# Patient Record
Sex: FEMALE | Race: WHITE | Employment: OTHER | URBAN - METROPOLITAN AREA
[De-identification: names, ages, dates, MRNs, and addresses within clinical notes are randomized per-mention and may not be internally consistent; named-entity substitution may affect disease eponyms.]

---

## 2017-04-13 ENCOUNTER — HOSPITAL ENCOUNTER (OUTPATIENT)
Dept: INFUSION THERAPY | Age: 82
Discharge: HOME | End: 2017-04-13
Payer: MEDICARE

## 2017-04-13 VITALS
RESPIRATION RATE: 18 BRPM | HEART RATE: 60 BPM | DIASTOLIC BLOOD PRESSURE: 57 MMHG | OXYGEN SATURATION: 98 % | SYSTOLIC BLOOD PRESSURE: 126 MMHG | TEMPERATURE: 97.5 F

## 2017-04-13 PROCEDURE — 74011250636 HC RX REV CODE- 250/636

## 2017-04-13 PROCEDURE — 96372 THER/PROPH/DIAG INJ SC/IM: CPT

## 2017-04-13 RX ADMIN — DENOSUMAB 60 MG: 60 INJECTION SUBCUTANEOUS at 09:59

## 2017-10-04 ENCOUNTER — HOSPITAL ENCOUNTER (OUTPATIENT)
Dept: VASCULAR SURGERY | Age: 82
Discharge: HOME | End: 2017-10-04
Attending: INTERNAL MEDICINE
Payer: MEDICARE

## 2017-10-04 DIAGNOSIS — I77.9 DISEASE OF ARTERY (HCC): ICD-10-CM

## 2017-10-04 PROCEDURE — 93880 EXTRACRANIAL BILAT STUDY: CPT

## 2017-10-04 NOTE — PROCEDURES
[facility]  *** FINAL REPORT ***    Name: Silvestre Cevallos  MRN: [id number]    Outpatient  : 1935  HIS Order #: [id number]  [id number] [facility] Visit #: [id number]  Date: 04 Oct 2017    TYPE OF TEST: Cerebrovascular Duplex    REASON FOR TEST  Transient ischemic attacks, General health evaluation    Right Carotid:-             Proximal               Mid                 Distal  cm/s  Systolic  Diastolic  Systolic  Diastolic  Systolic  Diastolic  CCA:     69.0       9.0       60.0      11.0       70.0      11.0  Bulb:  ECA:    135.0  ICA:     50.0      10.0       70.0      19.0       79.0      25.0  ICA/CCA:  1.0       2.8    ICA Stenosis: <50%    Right Vertebral:-  Finding: Antegrade  Sys:       36.0  Bridgett:        9.0    Right Subclavian: Normal    Left Carotid:-            Proximal                Mid                 Distal  cm/s  Systolic  Diastolic  Systolic  Diastolic  Systolic  Diastolic  CCA:     87.3      13.0      105.0      16.0       76.0      14.0  Bulb:  ECA:    104.0  ICA:     84.0      24.0       77.0      21.0       69.0      21.0  ICA/CCA:  0.8       1.5    ICA Stenosis: <50%    Left Vertebral:-  Finding: Antegrade  Sys:       60.0  Bridgett:       16.0    Left Subclavian: Normal    INTERPRETATION/FINDINGS  Duplex images were obtained using 2-D gray scale, color flow, and  spectral Doppler analysis. 1. Bilateral <50% stenosis of the internal carotid arteries. 2. No significant stenosis in the external carotid arteries  bilaterally. 3. Antegrade flow in both vertebral arteries. 4. Normal flow in both subclavian arteries. Plaque Morphology:  1. Homogeneous plaque in the bulb and right ICA. 2. Hyperechoic plaque in the bulb and left ICA. ADDITIONAL COMMENTS    I have personally reviewed the data relevant to the interpretation of  this  study. TECHNOLOGIST: Devika Martinez RDCS, RVT  Signed: 10/04/2017 09:59 AM    PHYSICIAN: Aramis Juarez.  Kristi Saleem MD  Signed: 10/05/2017 10:56 AM

## 2017-10-12 ENCOUNTER — HOSPITAL ENCOUNTER (OUTPATIENT)
Dept: INFUSION THERAPY | Age: 82
End: 2017-10-12

## 2017-10-26 ENCOUNTER — HOSPITAL ENCOUNTER (OUTPATIENT)
Dept: INFUSION THERAPY | Age: 82
Discharge: HOME | End: 2017-10-26
Payer: MEDICARE

## 2017-10-26 VITALS
TEMPERATURE: 97.5 F | RESPIRATION RATE: 18 BRPM | SYSTOLIC BLOOD PRESSURE: 131 MMHG | OXYGEN SATURATION: 93 % | HEART RATE: 59 BPM | DIASTOLIC BLOOD PRESSURE: 54 MMHG

## 2017-10-26 PROCEDURE — 74011250636 HC RX REV CODE- 250/636

## 2017-10-26 PROCEDURE — 96372 THER/PROPH/DIAG INJ SC/IM: CPT

## 2017-10-26 RX ADMIN — DENOSUMAB 60 MG: 60 INJECTION SUBCUTANEOUS at 10:29

## 2017-10-26 NOTE — PROGRESS NOTES
[facility] Progress Note    Date: 2017    Name: Mckenzie Mccallum    MRN: [id number]         : 1935      Ms. Truong arrived to [facility] at 1010. Ms. Sophia Lopes was assessed and education was provided. Ms. Brittany Traylor vitals were reviewed. Visit Vitals    /54 (BP 1 Location: Left arm, BP Patient Position: Sitting)    Pulse (!) 59    Temp 97.5 °F (36.4 °C)    Resp 18    SpO2 93%           Lab results were obtained and reviewed. Prolia   60 mg was administered as ordered SQ in patient's Right upper arm . Ms. Truong tolerated well without complaints. Ms. Sophia Lopes was discharged from [place] [address] in stable condition at 1030.     Ansley Connell RN  2017

## 2017-12-14 ENCOUNTER — HOSPITAL ENCOUNTER (OUTPATIENT)
Dept: PHYSICAL THERAPY | Age: 82
Discharge: HOME | End: 2017-12-14
Payer: MEDICARE

## 2017-12-14 PROCEDURE — 97112 NEUROMUSCULAR REEDUCATION: CPT | Performed by: PHYSICAL THERAPIST

## 2017-12-14 PROCEDURE — G8978 MOBILITY CURRENT STATUS: HCPCS | Performed by: PHYSICAL THERAPIST

## 2017-12-14 PROCEDURE — G8979 MOBILITY GOAL STATUS: HCPCS | Performed by: PHYSICAL THERAPIST

## 2017-12-14 PROCEDURE — 97162 PT EVAL MOD COMPLEX 30 MIN: CPT | Performed by: PHYSICAL THERAPIST

## 2017-12-14 PROCEDURE — 97161 PT EVAL LOW COMPLEX 20 MIN: CPT | Performed by: PHYSICAL THERAPIST

## 2017-12-14 NOTE — PROGRESS NOTES
PT DAILY TREATMENT NOTE/NEURO EVAL 3-16    Patient Name: Eulogio Cortez  Date:2017  : 1935  [x]  Patient  Verified  [place]: [place] MED[place] / Plan: [place] MEDICARE PART A & B / Product Type: Medicare /    In time:11:00  Out time:11:50  Total Treatment Time (min): 50  Total Timed Codes (min): 25  1:1 Treatment Time ([facility] only): 50   Visit #: 1 of 12    Treatment Area: Left leg weakness [R29.898]    SUBJECTIVE  Pain Level (0-10 scale): 0  []constant []intermittent []improving []worsening []no change since onset    Any medication changes, allergies to medications, adverse drug reactions, diagnosis change, or new procedure performed?: [x] No    [] Yes (see summary sheet for update)  Subjective functional status/changes:     Patient has CC of difficulty with walking and weakness, and decreased balance for 6-8months. SONI: prior fall and pelvic fracture, onset of weakness over the past few months. Denies pain. Denies numbness/tingling. Denies popping/clicking. Denies red flags: SOB, chest pain, dizziness/lightheadedness, blurred/double vision, HA, chills/fevers, night sweats, change in bowel/bladder control, abdominal pain, difficulty swallowing, slurred speech, unexplained weight gain/loss. PMHx: COPD, high cholesterol, stage 3 kd, DMII, HTN, PVD, CAD. Surgical Hx: CABGx2. Social Hx: 2 level home (no need for access to 2nd floor), alcohol, tobacco, work status. PLOF: regular walking, no stairs, independent ADLs. CLOF: decreased exercise level, does not use stairs.      OBJECTIVE/EXAMINATION    25 min [x]Eval                  []Re-Eval     25 min Neuromuscular Re-education:  [x]  See flow sheet :   Rationale: improve coordination, improve balance and increase proprioception  to improve the patients ability to complete ADLs            With   [] TE   [] TA   [] neuro   [] other: Patient Education: [x] Review HEP    [] Progressed/Changed HEP based on:   [] positioning   [] body mechanics   [] transfers   [] heat/ice application    [] other:        Physical Therapy Evaluation - Neurologic    Posture: [] Poor    [x] Fair    [] Good    Describe:       Gait: [] Normal    [x] Abnormal    Device:    SPC  Describe:    ROM:             WFL  Strength (MMT):  Hip L (1-5) R (1-5)   Hip Flexion 3 3   Hip Ext 2+ 2+   Hip ABD 3 3   Hip ADD     Hip ER     Hip IR       Knee L (1-5) R (1-5)   Knee Flexion 4 4   Knee Extension 4 4   Ankle PF 4 4   Ankle DF 4 4   Other         Reflexes: [x] Not Tested   Left Right   Biceps (C5)     Brachioradiais (C6)     Triceps (C7)     Knee Jerk (L4)     Ankle Jerk (SI)         Optional Tests:  30 second Sit<>stand: 8x  TU.88 secs  Tinetti Balance Assessment: 8 on Balance and Gait sections; total of 16/ _ indicates HIGH falls risk       Pain Level (0-10 scale) post treatment: 0    ASSESSMENT/Changes in Function: Patient presents with weakness and difficulty with ambulation. She is a falls risk per objective measures. Patient will continue to benefit from skilled PT services to modify and progress therapeutic interventions, address functional mobility deficits, address ROM deficits, address strength deficits, analyze and address soft tissue restrictions, analyze and cue movement patterns, analyze and modify body mechanics/ergonomics and assess and modify postural abnormalities to attain remaining goals.      [x]  See Plan of Care  []  See progress note/recertification  []  See Discharge Summary         Progress towards goals / Updated goals:  See POC    PLAN  []  Upgrade activities as tolerated     [x]  Continue plan of care  []  Update interventions per flow sheet       []  Discharge due to:_  []  Other:_      Stevenson Lam PT, DPT 2017  11:49 AM

## 2017-12-14 NOTE — PROGRESS NOTES
[facility]  [address] [place], [address]  Ph [phone]  Fx [phone]    Plan of Care/ Statement of Necessity for Physical Therapy Services    Patient name: Vibha Bergeron Start of Care: 2017   Referral source: Nba Velazquez, * : 1935    Medical Diagnosis: Left leg weakness [R29.898]   Onset Date:2017    Treatment Diagnosis: Weakness, difficulty with ambulation   Prior Hospitalization: see medical history Provider#: [id number]   Medications: Verified on Patient summary List    Comorbidities: COPD, high cholesterol, stage 3 CKD DMII, HTN, PVD, CAD   Prior Level of Function: regular walking, no stairs, independent ADLs      The Plan of Care and following information is based on the information from the initial evaluation. Assessment/ key information: Patient presents with weakness and difficulty with ambulation. She is a falls risk per objective measures. Patient will continue to benefit from skilled PT services to modify and progress therapeutic interventions, address functional mobility deficits, address ROM deficits, address strength deficits, analyze and address soft tissue restrictions, analyze and cue movement patterns, analyze and modify body mechanics/ergonomics and assess and modify postural abnormalities to attain remaining goals. Evaluation Complexity History HIGH Complexity :3+ comorbidities / personal factors will impact the outcome/ POC ; Examination MEDIUM Complexity : 3 Standardized tests and measures addressing body structure, function, activity limitation and / or participation in recreation  ;Presentation MEDIUM Complexity : Evolving with changing characteristics  ; Clinical Decision Making MEDIUM Complexity : FOTO score of 26-74  Overall Complexity Rating: MEDIUM  Problem List: pain affecting function, decrease strength, impaired gait/ balance, decrease ADL/ functional abilitiies, decrease activity tolerance, decrease flexibility/ joint mobility and decrease transfer abilities   Treatment Plan may include any combination of the following: Therapeutic exercise, Therapeutic activities, Neuromuscular re-education, Physical agent/modality, Gait/balance training, Manual therapy, Aquatic therapy, Patient education, Self Care training, Functional mobility training, Home safety training and Stair training  Patient / Family readiness to learn indicated by: asking questions, trying to perform skills and interest  Persons(s) to be included in education: patient (P)  Barriers to Learning/Limitations: None  Patient Goal (s): walk properly without cane  Patient Self Reported Health Status: fair  Rehabilitation Potential: fair    Short Term Goals: To be accomplished in 2 weeks:   Patient will report compliance with HEP at least 1x/day to aid in rehabilitation program.   Status at IE: NA   Current:     Patient will decrease TUG by 5 seconds to display MCID and progression to decreased falls risk. Status at IE: 18.8 seconds   Current:       Long Term Goals: To be accomplished in 6 weeks:   Patient will increase strength to 5/5 throughout B LEs to aid in return recreational activities and ADLs. Status at IE: 4/5 grossly   Current:     Patient will improve Tinetti score to 19 or greater to demonstrate decreased risk for falls. Status at IE: 16   Current:     Patient will ambulate 1000ft on level surface with LRAD and normalized gait. Status at IE:na   Current:     Patient will improve FOTO to TBD points overall to demonstrate improvement in functional ability. Status at IE:TBD   Current:        Frequency / Duration: Patient to be seen 2 times per week for 6 weeks.     Patient/ Caregiver education and instruction: Diagnosis, prognosis, self care, activity modification and exercises   [x]  Plan of care has been reviewed with PTA    G-Codes (GP)  Mobility   Current  CL= 60-79%   Goal  CK= 40-59%    The severity rating is based on clinical judgment and the FOTO score. Certification Period: 12/14/2017 - 1/25/2018  Dianna Anne PT, DPT  12/14/2017 12:01 PM    ________________________________________________________________________    I certify that the above Therapy Services are being furnished while the patient is under my care. I agree with the treatment plan and certify that this therapy is necessary.     Physician's Signature:____________________  Date:____________Time: _________    Please sign and return to [facility]  [address], [address]  Ph [phone]  Fx [phone]

## 2017-12-19 ENCOUNTER — HOSPITAL ENCOUNTER (OUTPATIENT)
Dept: PHYSICAL THERAPY | Age: 82
Discharge: HOME | End: 2017-12-19
Payer: MEDICARE

## 2017-12-19 PROCEDURE — 97110 THERAPEUTIC EXERCISES: CPT | Performed by: PHYSICAL THERAPIST

## 2017-12-19 NOTE — PROGRESS NOTES
PT DAILY TREATMENT NOTE - [facility]     Patient Name: Teresa Rivera  Date:2017  : 1935  [x]  Patient  Verified  Payor: [place] MEDICARE / Plan: [place] MEDICARE PART A & B / Product Type: Medicare /    In time:11:04  Out time:11:45  Total Treatment Time (min): 35  Total Timed Codes (min): 35  1:1 Treatment Time ( only): 15   Visit #: 2 of 12    Treatment Area: Left leg weakness [R29.898]    SUBJECTIVE  Pain Level (0-10 scale): 0  Any medication changes, allergies to medications, adverse drug reactions, diagnosis change, or new procedure performed?: [x] No    [] Yes (see summary sheet for update)  Subjective functional status/changes:   [] No changes reported  I just have weakness.      OBJECTIVE    Modality rationale:     Min Type Additional Details    [] Estim:  []Unatt       []IFC  []Premod                        []Other:  []w/ice   []w/heat  Position:  Location:    [] Estim: []Att    []TENS instruct  []NMES                    []Other:  []w/US   []w/ice   []w/heat  Position:  Location:    []  Traction: [] Cervical       []Lumbar                       [] Prone          []Supine                       []Intermittent   []Continuous Lbs:  [] before manual  [] after manual    []  Ultrasound: []Continuous   [] Pulsed                           []1MHz   []3MHz W/cm2:  Location:    []  Iontophoresis with dexamethasone         Location: [] Take home patch   [] In clinic    []  Ice     []  heat  []  Ice massage  []  Laser   []  Anodyne Position:  Location:    []  Laser with stim  []  Other:  Position:  Location:    []  Vasopneumatic Device Pressure:       [] lo [] med [] hi   Temperature: [] lo [] med [] hi   [] Skin assessment post-treatment:  []intact []redness- no adverse reaction    []redness - adverse reaction:       20 tech, 15 1:1 min Therapeutic Exercise:  [x] See flow sheet :   Rationale: increase ROM, increase strength, improve coordination and improve balance to improve the patients ability to improve mobility           With   [x] TE   [] TA   [] neuro   [] other: Patient Education: [x] Review HEP    [x] Progressed/Changed HEP based on:   [] positioning   [] body mechanics   [] transfers   [] heat/ice application    [] other: discussed and issued HEP. Other Objective/Functional Measures: good balance with tandem in bars, unsteady with side stepping      Pain Level (0-10 scale) post treatment: 0    ASSESSMENT/Changes in Function: pt tolerated session well this date. HEP reviewed and issued to client. Patient will continue to benefit from skilled PT services to modify and progress therapeutic interventions, address functional mobility deficits, address ROM deficits, address strength deficits, analyze and address soft tissue restrictions, analyze and cue movement patterns, analyze and modify body mechanics/ergonomics, assess and modify postural abnormalities and address imbalance/dizziness to attain remaining goals. []  See Plan of Care  []  See progress note/recertification  []  See Discharge Summary         Progress towards goals / Updated goals:  Short Term Goals: To be accomplished in 2 weeks:                        Patient will report compliance with HEP at least 1x/day to aid in rehabilitation program.                        Status at IE: NA                        Current: issued this date w/ cues for posture and safety                            Patient will decrease TUG by 5 seconds to display MCID and progression to decreased falls risk. Status at IE: 18.8 seconds                        Current: n/a         Long Term Goals: To be accomplished in 6 weeks:                        Patient will increase strength to 5/5 throughout B LEs to aid in return recreational activities and ADLs.                         Status at IE: 4/5 grossly                        Current: n/a                           Patient will improve Tinetti score to 19 or greater to demonstrate decreased risk for falls. Status at IE: 16                        Current: n/a                           Patient will ambulate 1000ft on level surface with LRAD and normalized gait. Status at IE:na                        Current: n/a                           Patient will improve FOTO to TBD points overall to demonstrate improvement in functional ability.                          Status at IE:TBD                        Current: n/a     PLAN  [x]  Upgrade activities as tolerated     [x]  Continue plan of care  []  Update interventions per flow sheet       []  Discharge due to:_  []  Other:_      Margarito Kearney PT 12/19/2017  12:10 PM    Future Appointments  Date Time Provider Quintin Costello   1/4/2018 10:30 AM Margarito Kearney [facility]   1/10/2018 11:00 AM Mary Grace Infante [facility]   1/12/2018 9:30 AM Giovanni Brannon [facility]   1/17/2018 9:30 AM Giovanni Brannon [facility]   1/19/2018 9:30 AM Giovanni Brannon [facility]   1/22/2018 9:30 AM Giovanni Brannon [facility]   1/25/2018 9:30 AM Giovanni Brannon [facility]   4/26/2018 10:00 AM [facility] INFUSION NURSE 1 [facility]

## 2018-01-04 ENCOUNTER — APPOINTMENT (OUTPATIENT)
Dept: PHYSICAL THERAPY | Age: 83
End: 2018-01-04
Payer: MEDICARE

## 2018-01-10 ENCOUNTER — APPOINTMENT (OUTPATIENT)
Dept: PHYSICAL THERAPY | Age: 83
End: 2018-01-10
Payer: MEDICARE

## 2018-01-12 ENCOUNTER — APPOINTMENT (OUTPATIENT)
Dept: PHYSICAL THERAPY | Age: 83
End: 2018-01-12
Payer: MEDICARE

## 2018-01-17 ENCOUNTER — APPOINTMENT (OUTPATIENT)
Dept: PHYSICAL THERAPY | Age: 83
End: 2018-01-17
Payer: MEDICARE

## 2018-01-19 ENCOUNTER — HOSPITAL ENCOUNTER (OUTPATIENT)
Dept: PHYSICAL THERAPY | Age: 83
Discharge: HOME | End: 2018-01-19
Payer: MEDICARE

## 2018-01-19 PROCEDURE — 97110 THERAPEUTIC EXERCISES: CPT | Performed by: PHYSICAL THERAPIST

## 2018-01-19 PROCEDURE — G8978 MOBILITY CURRENT STATUS: HCPCS | Performed by: PHYSICAL THERAPIST

## 2018-01-19 PROCEDURE — G8979 MOBILITY GOAL STATUS: HCPCS | Performed by: PHYSICAL THERAPIST

## 2018-01-19 NOTE — PROGRESS NOTES
PT DAILY TREATMENT NOTE - [facility]     Patient Name: Anamika Dumont  Date:2018  : 1935  [x]  Patient  Verified  Payor: Jenn Fonseca / Plan: [place] MEDICARE PART A & B / Product Type: Medicare /    In BSIU:[id number]  Out time:1032  Total Treatment Time (min): 59  Total Timed Codes (min): 59  1:1 Treatment Time ([facility] only): 39  Visit #: 3 of 12    Treatment Area: Left leg weakness [R29.898]    SUBJECTIVE  Pain Level (0-10 scale): 0  Any medication changes, allergies to medications, adverse drug reactions, diagnosis change, or new procedure performed?: [x] No    [] Yes (see summary sheet for update)  Subjective functional status/changes:   [] No changes reported  Pt has had a month break from therapy due to transportation and weather and holidays but pt states attempting ex at home maily SLR in standing and squats     OBJECTIVE        59 min Therapeutic Exercise:  [x] See flow sheet :   Rationale: increase ROM, increase strength, improve coordination and improve balance to improve the patients ability to improve mobility             With   [] TE   [] TA   [] neuro   [] other: Patient Education: [x] Review HEP    [] Progressed/Changed HEP based on:   [] positioning   [] body mechanics   [] transfers   [] heat/ice application    [] other:      Other Objective/Functional Measures: TUG 21 sec without assist device   FOTO:      Pain Level (0-10 scale) post treatment: 0    ASSESSMENT/Changes in Function: stiff gait , tend to step wider with right leg in gait for balance , performed all ex with good effort , + cues for slow controlled movt with SLR with tband ex but no problems with addition of resistive band today     Patient will continue to benefit from skilled PT services to modify and progress therapeutic interventions, address functional mobility deficits, address ROM deficits, address strength deficits, analyze and address soft tissue restrictions, analyze and cue movement patterns, analyze and modify body mechanics/ergonomics, assess and modify postural abnormalities and address imbalance/dizziness to attain remaining goals. [x]  See Plan of Care  []  See progress note/recertification  []  See Discharge Summary         Progress towards goals / Updated goals:  Short Term Goals: To be accomplished in 2 weeks:                        Patient will report compliance with HEP at least 1x/day to aid in rehabilitation program.                        Status at IE: NA                        Current: issued w/ cues for posture and safety                             Patient will decrease TUG by 5 seconds to display MCID and progression to decreased falls risk.                        Status at IE: 18.8 seconds                        Current: 21 sec , not met           Long Term Goals: To be accomplished in 6 weeks:                        Patient will increase strength to 5/5 throughout B LEs to aid in return recreational activities and ADLs.                       GVNJBN at IE: 4/5 grossly                        Current: n/a                            Patient will improve Tinetti score to 19 or greater to demonstrate decreased risk for falls.                        Status at IE: 16                        Current: n/a                            Patient will ambulate 1000ft on level surface with LRAD and normalized gait.                       IZTUHH at IE:na                        Current: n/a                            Patient will improve FOTO to TBD points overall to demonstrate improvement in functional ability.                          RORAZM at IE:TBD                        Current: n/a    PLAN  [x]  Upgrade activities as tolerated     [x]  Continue plan of care  []  Update interventions per flow sheet       []  Discharge due to:_  []  Other:_      Vinod Ta, PT 1/19/2018  9:44 AM    Future Appointments  Date Time Provider Quintin Costello   1/22/2018 9:30 AM Ame Rodriges, PT [facility]   1/25/2018 9:30 AM Jacqueline Mills, PT [facility]   2/2/2018 10:30 AM Jacqueline Mills PT [facility]   4/26/2018 10:00 AM [facility] NURSE 1 [facility]

## 2018-01-22 ENCOUNTER — HOSPITAL ENCOUNTER (OUTPATIENT)
Dept: PHYSICAL THERAPY | Age: 83
Discharge: HOME | End: 2018-01-22
Payer: MEDICARE

## 2018-01-22 PROCEDURE — 97112 NEUROMUSCULAR REEDUCATION: CPT | Performed by: PHYSICAL THERAPIST

## 2018-01-22 PROCEDURE — 97110 THERAPEUTIC EXERCISES: CPT | Performed by: PHYSICAL THERAPIST

## 2018-01-22 NOTE — PROGRESS NOTES
[facility]  [address] [place], [address] [facility] [place]  Ph [phone]  Fx [phone]    Continued Plan of Care/ Re-certification for Physical Therapy Services    Patient name: Xiomara Begum Start of Care: 2017   Referral source: Dennys Mosquera, * : 1935   Medical/Treatment Diagnosis: Left leg weakness [R29.898] Onset Date:2017     Prior Hospitalization: see medical history Provider#: [id number]   Medications: Verified on Patient Summary List    Comorbidities: COPD, hypercholesterolemia, stage 3 CKF, DMII, HTN, PVD, CAD  Prior Level of Function:regular walking, no stairs, independent with ADLs    Visits from Start of Care: 4    Missed Visits: 2    The Plan of Care and following information is based on the patient's current status:  Short Term Goals: To be accomplished in 2 weeks:                        Patient will report compliance with HEP at least 1x/day to aid in rehabilitation program.                        AOROYC at IE: NA                        Current: issued this date w/ cues for posture and safety                             Patient will decrease TUG by 5 seconds to display MCID and progression to decreased falls risk.                        Status at IE: 18.8 seconds                        Current: 21secs (increase in time, worsening)          Long Term Goals: To be accomplished in 6 weeks:                        Patient will increase strength to 5/5 throughout B LEs to aid in return recreational activities and ADLs.                       UBSEJC at IE: 4/5 grossly                        Current: n/a                            Patient will improve Tinetti score to 19 or greater to demonstrate decreased risk for falls.                        Status at IE: 16                        Current: n/a                            Patient will ambulate 1000ft on level surface with LRAD and normalized gait.                         ZKQCFA at IE:na                        Current: n/a                            Patient will improve FOTO to 46 points overall to demonstrate improvement in functional ability.                       QWYQLS at IE:40                        Current: n/a    Key functional changes: decline in status, due to pt inability to maintain therapy while sick and over the holidays and snow period      Problems/ barriers to goal attainment: NA     Problem List: decrease ROM, decrease strength, impaired gait/ balance, decrease flexibility/ joint mobility and decrease transfer abilities    Treatment Plan: Therapeutic exercise, Therapeutic activities, Neuromuscular re-education, Physical agent/modality, Gait/balance training, Manual therapy, Patient education, Self Care training, Functional mobility training, Home safety training and Stair training       Goals for this certification period to be accomplished in 4 weeks:  Continue unmet goals above    Frequency / Duration: Patient to be seen 2 times per week for 4 weeks:    Assessment / Donnell Carlos has made minmal progress to date due to low frequency and number of visits since start of care. There were circumstantial issues (snow, flu, holiday's) which have resolved. Patient will continue to benefit from skilled PT services to address remaining deficit and reduce falls risk. Patient will continue to benefit from skilled PT services to modify and progress therapeutic interventions, address functional mobility deficits, analyze and cue movement patterns, assess and modify postural abnormalities, address imbalance/dizziness and instruct in home and community integration to attain remaining goals. G-Codes (GP)  Mobility  [id number] Current  CL= 60-79%  [id number] Goal  CK= 40-59%    The severity rating is based on clinical judgment and the FOTO score.     Certification Period: 1/19/2018 - 1/16/2018    Juana Coe, PT, DPT 1/22/2018 2:13 PM    ________________________________________________________________________  I certify that the above Therapy Services are being furnished while the patient is under my care. I agree with the treatment plan and certify that this therapy is necessary. [] I have read the above and request that my patient continue as recommended.   [] I have read the above report and request that my patient continue therapy with the following changes/special instructions: _______________________________________  [] I have read the above report and request that my patient be discharged from therapy    Physician's Signature:________________________________Date:___________Time:__________    Please sign and return to [facility]  [address], [address]  Ph [phone]  Fx [phone]

## 2018-01-22 NOTE — PROGRESS NOTES
PT DAILY TREATMENT NOTE - [facility]     Patient Name: Dipak Gutierrez  Date:2018  : 1935  [x]  Patient  Verified  [place]or[place] MED[place]ARE / Plan: [place] MEDICARE PART A & B / Product Type: Medicare /    In time:9:33  Out time:10:17  Total Treatment Time (min): 44  Total Timed Codes (min): 44  1:1 Treatment Time ( [address] only): 40   Visit #: 4 of 12    Treatment Area: Left leg weakness [R29.898]    SUBJECTIVE  Pain Level (0-10 scale): 5  Any medication changes, allergies to medications, adverse drug reactions, diagnosis change, or new procedure performed?: [x] No    [] Yes (see summary sheet for update)  Subjective functional status/changes:   [] No changes reported  The lower legs are hurting today. Did a lot of walking while shopping yesterday    OBJECTIVE      30 min Therapeutic Exercise:  [x] See flow sheet :   Rationale: increase ROM, increase strength and decrease pain to improve the patients ability to complete ADLs    14 min Neuromuscular Re-education:  X-  See flow sheet :   Rationale: improve coordination, improve balance and increase proprioception  to improve the patients ability to complete ADLs and decrease falls risk            With   [] TE   [] TA   [] neuro   [] other: Patient Education: [x] Review HEP    [] Progressed/Changed HEP based on:   [] positioning   [] body mechanics   [] transfers   [] heat/ice application    [] other:         Pain Level (0-10 scale) post treatment: 5    ASSESSMENT/Changes in Function: Patient responds well to treatment session. Is challenged by adjusting step length with NMR exercises. Able to perform step taps along with other stationary exercises with minimal to no UE support.  No adverse effects were noted from today's treatment session      Patient will continue to benefit from skilled PT services to modify and progress therapeutic interventions, address functional mobility deficits, analyze and cue movement patterns, assess and modify postural abnormalities, address imbalance/dizziness and instruct in home and community integration to attain remaining goals. []  See Plan of Care  [x]  See progress note/recertification  []  See Discharge Summary         Progress towards goals / Updated goals:  Short Term Goals: To be accomplished in 2 weeks:                        Patient will report compliance with HEP at least 1x/day to aid in rehabilitation program.                        JJKOQX at IE: NA                        Current: issued this date w/ cues for posture and safety                             Patient will decrease TUG by 5 seconds to display MCID and progression to decreased falls risk.                        Status at IE: 18.8 seconds                        Current: 21secs          Long Term Goals: To be accomplished in 6 weeks:                        Patient will increase strength to 5/5 throughout B LEs to aid in return recreational activities and ADLs.                       KIMHWH at IE: 4/5 grossly                        Current: n/a                            Patient will improve Tinetti score to 19 or greater to demonstrate decreased risk for falls.                        Status at IE: 16                        Current: n/a                            Patient will ambulate 1000ft on level surface with LRAD and normalized gait.                       IEYGXP at IE:na                        Current: n/a                            Patient will improve FOTO to TBD points overall to demonstrate improvement in functional ability.                          YHUEKW at IE:TBD                        Current: n/a    PLAN  []  Upgrade activities as tolerated     [x]  Continue plan of care  []  Update interventions per flow sheet       []  Discharge due to:_  []  Other:_      Jeri Chance PT, DPT 1/22/2018  9:35 AM    Future Appointments  Date Time Provider Quintin Costello   1/25/2018 9:30 AM Cass Duran, PT [facility]   2/2/2018 10:30 AM Cass Duran, PT [address] [facility]   4/26/2018 10:00 AM [facility] [address] [facility]

## 2018-01-25 ENCOUNTER — HOSPITAL ENCOUNTER (OUTPATIENT)
Dept: PHYSICAL THERAPY | Age: 83
Discharge: HOME | End: 2018-01-25
Payer: MEDICARE

## 2018-01-25 PROCEDURE — 97112 NEUROMUSCULAR REEDUCATION: CPT | Performed by: PHYSICAL THERAPIST

## 2018-01-25 PROCEDURE — 97110 THERAPEUTIC EXERCISES: CPT | Performed by: PHYSICAL THERAPIST

## 2018-01-25 NOTE — PROGRESS NOTES
PT DAILY TREATMENT NOTE - [facility]     Patient Name: Wilmer Smyth  Date:2018  : 1935  [x]  Patient  Verified  Payor: [place] MEDICARE / Plan: [place] MEDICARE PART A & B / Product Type: Medicare /    In time:9:30  Out time:10:20  Total Treatment Time (min): 50  Total Timed Codes (min): 50  1:1 Treatment Time ( [address] only): 50   Visit #: 5 of 12    Treatment Area: Left leg weakness [R29.898]    SUBJECTIVE  Pain Level (0-10 scale): 0  Any medication changes, allergies to medications, adverse drug reactions, diagnosis change, or new procedure performed?: [x] No    [] Yes (see summary sheet for update)  Subjective functional status/changes:   [] No changes reported  Wants to make progress. Wants a miracle    OBJECTIVE      25 min Therapeutic Exercise:  [x] See flow sheet :   Rationale: increase ROM, increase strength and decrease pain to improve the patients ability to complete ADLs    25 min Neuromuscular Re-education:  [x]  See flow sheet :   Rationale: improve coordination, improve balance and increase proprioception  to improve the patients ability to complete ADLs and decrease falls risk            With   [] TE   [] TA   [] neuro   [] other: Patient Education: [x] Review HEP    [] Progressed/Changed HEP based on:   [] positioning   [] body mechanics   [] transfers   [] heat/ice application    [] other:         Pain Level (0-10 scale) post treatment: 0    ASSESSMENT/Changes in Function: Patient responds well to treatment session. Patient has had minimal continuity of care to date, thus has had minimal progress. Attempted to explain this to patient to provide more realistic context and timeline for improvement. She was receptive.  . No adverse effects were noted from today's treatment session      Patient will continue to benefit from skilled PT services to modify and progress therapeutic interventions, address functional mobility deficits, address ROM deficits, address strength deficits, analyze and address soft tissue restrictions, analyze and cue movement patterns, analyze and modify body mechanics/ergonomics and assess and modify postural abnormalities to attain remaining goals. []  See Plan of Care  []  See progress note/recertification  []  See Discharge Summary         Progress towards goals / Updated goals:  Short Term Goals: To be accomplished in 2 weeks:                        Patient will report compliance with HEP at least 1x/day to aid in rehabilitation program.                        JBHFTY at IE: NA                        Current: issued this date w/ cues for posture and safety                             Patient will decrease TUG by 5 seconds to display MCID and progression to decreased falls risk.                        Status at IE: 18.8 seconds                        Current: 21secs (increase in time, worsening)          Long Term Goals: To be accomplished in 6 weeks:                        Patient will increase strength to 5/5 throughout B LEs to aid in return recreational activities and ADLs.                       KNBDLR at IE: 4/5 grossly                        Current: n/a                            Patient will improve Tinetti score to 19 or greater to demonstrate decreased risk for falls.                        Status at IE: 16                        Current: n/a                            Patient will ambulate 1000ft on level surface with LRAD and normalized gait.                       ENLGXO at IE:na                        Current: n/a                            Patient will improve FOTO to 46 points overall to demonstrate improvement in functional ability.                          ZEYWTU at IE:40                        Current: n/a    PLAN  []  Upgrade activities as tolerated     [x]  Continue plan of care  []  Update interventions per flow sheet       []  Discharge due to:_  []  Other:_      Yann Chacon, PT, DPT 1/25/2018  10:02 AM    Future Appointments  Date Time Provider Department [place]   2/5/2018 10:30 AM Penny Vidal, PT [facility]   4/26/2018 10:00 AM THE [facility] INFUSION NURSE 1 AMBER Payan [facility]

## 2018-01-29 ENCOUNTER — HOSPITAL ENCOUNTER (OUTPATIENT)
Dept: PHYSICAL THERAPY | Age: 83
Discharge: HOME | End: 2018-01-29
Payer: MEDICARE

## 2018-01-29 PROCEDURE — G8978 MOBILITY CURRENT STATUS: HCPCS | Performed by: PHYSICAL THERAPIST

## 2018-01-29 PROCEDURE — 97110 THERAPEUTIC EXERCISES: CPT | Performed by: PHYSICAL THERAPIST

## 2018-01-29 PROCEDURE — G8979 MOBILITY GOAL STATUS: HCPCS | Performed by: PHYSICAL THERAPIST

## 2018-01-29 PROCEDURE — 97112 NEUROMUSCULAR REEDUCATION: CPT | Performed by: PHYSICAL THERAPIST

## 2018-01-29 NOTE — PROGRESS NOTES
PT DAILY TREATMENT NOTE - [facility]     Patient Name: Celeste Hanna  Date:2018  : 1935  [x]  Patient  Verified  Payor: [place] MEDICARE / Plan: [place] MEDICARE PART A & B / Product Type: Medicare /    In time:11:00  Out time:11:50  Total Treatment Time (min): 50  Total Timed Codes (min): 50  1:1 Treatment Time ([facility] only): 50   Visit #: 6 of 12    Treatment Area: Left leg weakness [R29.898]    SUBJECTIVE  Pain Level (0-10 scale): 0  Any medication changes, allergies to medications, adverse drug reactions, diagnosis change, or new procedure performed?: [x] No    [] Yes (see summary sheet for update)  Subjective functional status/changes:   [] No changes reported  Feels alright. No new issues. OBJECTIVE    25 min Therapeutic Exercise:  [x] See flow sheet :   Rationale: increase ROM and increase strength to improve the patients ability to complete ADLs       25 min Neuromuscular Re-education:  [x]  See flow sheet :   Rationale: improve coordination, improve balance and increase proprioception  to improve the patients ability to complete ADLs and decrease falls          With   [] TE   [] TA   [] neuro   [] other: Patient Education: [x] Review HEP    [] Progressed/Changed HEP based on:   [] positioning   [] body mechanics   [] transfers   [] heat/ice application    [] other:         Pain Level (0-10 scale) post treatment: 0    ASSESSMENT/Changes in Function: Patient responds well to treatment session. Patient is challenged by NMR in hallway. Head turns during ambulation disrupt her shaniqua and produce LOB. Will continue to progress variable NMR training outside of parallel bars to aid in improvements in balance.  No adverse effects were noted from today's treatment session      Patient will continue to benefit from skilled PT services to modify and progress therapeutic interventions, address functional mobility deficits, address ROM deficits, address strength deficits, analyze and cue movement patterns, analyze and modify body mechanics/ergonomics, assess and modify postural abnormalities, address imbalance/dizziness and instruct in home and community integration to attain remaining goals. []  See Plan of Care  []  See progress note/recertification  []  See Discharge Summary         Progress towards goals / Updated goals:  Short Term Goals: To be accomplished in 2 weeks:                        Patient will report compliance with HEP at least 1x/day to aid in rehabilitation program.                        RLLYIA at IE: NA                        Current: issued this date w/ cues for posture and safety                             Patient will decrease TUG by 5 seconds to display MCID and progression to decreased falls risk.                        Status at IE: 18.8 seconds                        Current: 21secs (increase in time, worsening)          Long Term Goals: To be accomplished in 6 weeks:                        Patient will increase strength to 5/5 throughout B LEs to aid in return recreational activities and ADLs.                       KJJGGM at IE: 4/5 grossly                        Current: n/a                            Patient will improve Tinetti score to 19 or greater to demonstrate decreased risk for falls.                        Status at IE: 16                        Current: n/a                            Patient will ambulate 1000ft on level surface with LRAD and normalized gait.                       QCDSIO at IE:na                        Current: n/a                            Patient will improve FOTO to 46 points overall to demonstrate improvement in functional ability.                          TNAJNW at IE:40                        Current: n/a    PLAN  []  Upgrade activities as tolerated     [x]  Continue plan of care  []  Update interventions per flow sheet       []  Discharge due to:_  []  Other:_      Konrad Thomas PT, DPT 1/29/2018  11:25 AM    Future Appointments  Date Time Provider Quintin Costello   2/1/2018 10:00 AM Alex Wiggins, PT [facility]   2/5/2018 10:30 AM [facility] PT RES CTR [facility]   4/26/2018 10:00 AM [facility] INFUSION NURSE 1 [facility]

## 2018-02-01 ENCOUNTER — HOSPITAL ENCOUNTER (OUTPATIENT)
Dept: PHYSICAL THERAPY | Age: 83
Discharge: HOME | End: 2018-02-01
Payer: MEDICARE

## 2018-02-01 PROCEDURE — 97112 NEUROMUSCULAR REEDUCATION: CPT | Performed by: PHYSICAL THERAPIST

## 2018-02-01 PROCEDURE — 97110 THERAPEUTIC EXERCISES: CPT | Performed by: PHYSICAL THERAPIST

## 2018-02-01 NOTE — PROGRESS NOTES
PT DAILY TREATMENT NOTE - [facility]     Patient Name: Rolando Maldonado  Date:2018  : 1935  [x]  Patient  Verified  Payor: [place] MEDICARE / Plan: [place] MEDICARE PART A & B / Product Type: Medicare /    In time:1000  Out time:1050  Total Treatment Time (min): 50  Total Timed Codes (min): 50  1:1 Treatment Time ( [address] only): 30   Visit #: 7 of 12    Treatment Area: Left leg weakness [R29.898]    SUBJECTIVE  Pain Level (0-10 scale): 0  Any medication changes, allergies to medications, adverse drug reactions, diagnosis change, or new procedure performed?: [x] No    [] Yes (see summary sheet for update)  Subjective functional status/changes:   [] No changes reported  Pt states she feels she is not progressing quickly , using cane in community to walk but often uses walker at home to allow her to walk faster and still feel safe. OBJECTIVE  30 min Therapeutic Exercise:  [x] See flow sheet :   Rationale: increase ROM and increase strength to improve the patients ability to complete ADLs         20 min Neuromuscular Re-education:  [x]  See flow sheet :   Rationale: improve coordination, improve balance and increase proprioception  to improve the patients ability to complete ADLs and decrease falls          With   [] TE   [] TA   [] neuro   [] other: Patient Education: [x] Review HEP    [] Progressed/Changed HEP based on:   [] positioning   [] body mechanics   [] transfers   [] heat/ice application    [] other:      Other Objective/Functional Measures: ex per grid      Pain Level (0-10 scale) post treatment: 0    ASSESSMENT/Changes in Function: challanged balance during gait in safe environment cues to not use hand hold but have hands ready in bars periodic hand hold with tandem , side step min to no hand hold , side step against tband out of bars therapist with contact guard assist tolerated well min help from therapist needed.       Patient will continue to benefit from skilled PT services to modify and progress therapeutic interventions, address functional mobility deficits, address ROM deficits, address strength deficits, analyze and address soft tissue restrictions, analyze and cue movement patterns, analyze and modify body mechanics/ergonomics, assess and modify postural abnormalities and address imbalance/dizziness to attain remaining goals. [x]  See Plan of Care  []  See progress note/recertification  []  See Discharge Summary         Progress towards goals / Updated goals:  Short Term Goals: To be accomplished in 2 weeks:                        BGXCBMJ will report compliance with HEP at least 1x/day to aid in rehabilitation program.                        NCEUJQ at IE: NA                        Current: issued past appt , discussed safety pt doing well working on side step in kitchen with counter for assist                             Patient will decrease TUG by 5 seconds to display MCID and progression to decreased falls risk.                        Status at IE: 18.8 seconds                        Current: 21secs (increase in time, worsening) prior session           Long Term Goals: To be accomplished in 6 weeks:                        Patient will increase strength to 5/5 throughout B LEs to aid in return recreational activities and ADLs.                       ZQVZXE at IE: 4/5 grossly                        Current: n/a                            Patient will improve Tinetti score to 19 or greater to demonstrate decreased risk for falls.                        Status at IE: 16                        Current: n/a                            Patient will ambulate 1000ft on level surface with LRAD and normalized gait.                       YWCVCZ at IE:na                        Current: n/a                            Patient will improve FOTO to 46 points overall to demonstrate improvement in functional ability.                          FYVQUE at IE:40                        GZPMXNX: 97/032 ( 1/29/2018)    PLAN  [x]  Upgrade activities as tolerated     [x]  Continue plan of care  []  Update interventions per flow sheet       []  Discharge due to:_  []  Other:_      Aida Matthews, PT 2/1/2018  10:59 AM    Future Appointments  Date Time Provider Quintin Costello   2/5/2018 10:30 AM Aida Matthews, PT [facility]   4/26/2018 10:00 AM THE [facility] INFUSION NURSE 1 AMBER Payan [facility]

## 2018-02-02 ENCOUNTER — APPOINTMENT (OUTPATIENT)
Dept: PHYSICAL THERAPY | Age: 83
End: 2018-02-02
Payer: MEDICARE

## 2018-02-05 ENCOUNTER — APPOINTMENT (OUTPATIENT)
Dept: PHYSICAL THERAPY | Age: 83
End: 2018-02-05
Payer: MEDICARE

## 2018-02-07 ENCOUNTER — HOSPITAL ENCOUNTER (OUTPATIENT)
Dept: PHYSICAL THERAPY | Age: 83
Discharge: HOME | End: 2018-02-07
Payer: MEDICARE

## 2018-02-07 PROCEDURE — 97112 NEUROMUSCULAR REEDUCATION: CPT | Performed by: PHYSICAL THERAPIST

## 2018-02-07 PROCEDURE — 97110 THERAPEUTIC EXERCISES: CPT | Performed by: PHYSICAL THERAPIST

## 2018-02-07 NOTE — PROGRESS NOTES
PT DAILY TREATMENT NOTE - [facility]     Patient Name: Francoise Zay  Date:2018  : 1935  [x]  Patient  Verified  Payor: [place] MED[place]ARE / Plan: [place] MEDICARE PART A & B / Product Type: Medicare /    In time:1300  Out time:1355  Total Treatment Time (min): 55  Total Timed Codes (min): 55  1:1 Treatment Time ( [address] only): 35   Visit #: 8 of 12    Treatment Area: Other symptoms and signs involving the musculoskeletal system [R29.898]    SUBJECTIVE  Pain Level (0-10 scale): 0  Any medication changes, allergies to medications, adverse drug reactions, diagnosis change, or new procedure performed?: [x] No    [] Yes (see summary sheet for update)  Subjective functional status/changes:   [] No changes reported  Pt states feels faster and therefore safer with the walker in her home , uses cane for outdoors , pt concern that she is still unsteady and feels like she limps on left hand side      OBJECTIVE  35 min Therapeutic Exercise:  [x] See flow sheet :   Rationale: increase ROM and increase strength to improve the patients ability to complete ADLs          20 min Neuromuscular Re-education:  [x]  See flow sheet : balance during gait    Rationale: improve coordination, improve balance and increase proprioception  to improve the patients ability to complete ADLs and decrease falls          With   [] TE   [] TA   [] neuro   [] other: Patient Education: [x] Review HEP    [] Progressed/Changed HEP based on:   [] positioning   [] body mechanics   [] transfers   [] heat/ice application    [] other:      Other Objective/Functional Measures: TUG 23 sec with cane , 24 sec without cane , ambulates with less WB time on left decr stride. Pt feels left side doesn't feel the same as the right .    Ambulated with cane outdoors over various terrain grass, gravel , concrete , leaves , up and down curb 15-20 min SBA / CGA With no LOB     Pain Level (0-10 scale) post treatment: 0    ASSESSMENT/Changes in Function: pt decr stride WB on left , tends to have lower clearance left foot at times drags foot if on soft surface such as leaves and grass , gait is slow but steady , TUG same to 1-2 sec slower this session     Patient will continue to benefit from skilled PT services to modify and progress therapeutic interventions, address functional mobility deficits, address ROM deficits, address strength deficits, analyze and address soft tissue restrictions, analyze and cue movement patterns, analyze and modify body mechanics/ergonomics, assess and modify postural abnormalities and address imbalance/dizziness to attain remaining goals. [x]  See Plan of Care  []  See progress note/recertification  []  See Discharge Summary         Progress towards goals / Updated goals:  Short Term Goals: To be accomplished in 2 weeks:                        VCLHTHO will report compliance with HEP at least 1x/day to aid in rehabilitation program.                        NBFYIJ at IE: NA                        Current: issued past appt , discussed safety pt doing well working on side step in kitchen with counter for assist                             Patient will decrease TUG by 5 seconds to display MCID and progression to decreased falls risk.                        Status at IE: 18.8 seconds                        Current: 21secs (increase in time, worsening) prior session           Long Term Goals: To be accomplished in 6 weeks:                        Patient will increase strength to 5/5 throughout B LEs to aid in return recreational activities and ADLs.                       KLGDXT at IE: 4/5 grossly                        Current: n/a                            Patient will improve Tinetti score to 19 or greater to demonstrate decreased risk for falls.                        Status at IE: 16                        Current: n/a                            Patient will ambulate 1000ft on level surface with LRAD and normalized gait.                         EYTRFR at IE:na                        Current: n/a                            Patient will improve FOTO to 46 points overall to demonstrate improvement in functional ability.                          UEZRLQ at IE:40                        RNBQKMR: 88/119 ( 1/29/2018)    PLAN  [x]  Upgrade activities as tolerated     [x]  Continue plan of care  []  Update interventions per flow sheet       []  Discharge due to:_  []  Other:_      David Banerjee, PT 2/7/2018  1:04 PM    Future Appointments  Date Time Provider Quintin Costello   2/13/2018 10:00 AM Basilio Vaughn, [facility]   2/15/2018 10:00 AM Basilio Vaughn [facility]   2/20/2018 10:00 AM Basilio Vaughn, [facility]   2/22/2018 10:00 AM Basilio Vaughn, [facility]   4/26/2018 10:00 AM [facility] INFUSION NURSE 1 [facility]

## 2018-02-13 ENCOUNTER — HOSPITAL ENCOUNTER (OUTPATIENT)
Dept: PHYSICAL THERAPY | Age: 83
Discharge: HOME | End: 2018-02-13
Payer: MEDICARE

## 2018-02-13 PROCEDURE — 97112 NEUROMUSCULAR REEDUCATION: CPT | Performed by: PHYSICAL THERAPIST

## 2018-02-13 PROCEDURE — 97110 THERAPEUTIC EXERCISES: CPT | Performed by: PHYSICAL THERAPIST

## 2018-02-13 NOTE — PROGRESS NOTES
PT DAILY TREATMENT NOTE - [facility]     Patient Name: Mirela Wilde  Date:2018  : 1935  [x]  Patient  Verified  Payor: Mckenzie Mustafa / Plan: [place] MEDICARE PART A & B / Product Type: Medicare /    In time:10:00  Out time:10:49  Total Treatment Time (min): 49  Total Timed Codes (min): 49  1:1 Treatment Time ( [address] only): 38   Visit #: 9 of 12    Treatment Area: Other symptoms and signs involving the musculoskeletal system [R29.898]    SUBJECTIVE  Pain Level (0-10 scale): 0  Any medication changes, allergies to medications, adverse drug reactions, diagnosis change, or new procedure performed?: [x] No    [] Yes (see summary sheet for update)  Subjective functional status/changes:   [] No changes reported  Feels alright. Does not feel like she has made progress yet    OBJECTIVE    13 min Therapeutic Exercise:  [x] See flow sheet :   Rationale: increase ROM, increase strength and improve coordination to improve the patients ability to complete ADLs    25 min Neuromuscular Re-education:  []  See flow sheet :   Rationale: improve coordination, improve balance and increase proprioception  to improve the patients ability to complete ADLs and decrease falls risk            With   [] TE   [] TA   [] neuro   [] other: Patient Education: [x] Review HEP    [] Progressed/Changed HEP based on:   [] positioning   [] body mechanics   [] transfers   [] heat/ice application    [] other:        Pain Level (0-10 scale) post treatment: 0    ASSESSMENT/Changes in Function: Patient responds well to treatment session. Difficulty with SLS on R LE during cone taps  Is challenged overall during obstacle course. Educated on the need for consistency and time to make improvements. She was receptive.  No adverse effects were noted from today's treatment session      Patient will continue to benefit from skilled PT services to modify and progress therapeutic interventions, address functional mobility deficits, address ROM deficits, address strength deficits, analyze and address soft tissue restrictions, analyze and cue movement patterns, analyze and modify body mechanics/ergonomics and assess and modify postural abnormalities to attain remaining goals. []  See Plan of Care  []  See progress note/recertification  []  See Discharge Summary         Progress towards goals / Updated goals:  Short Term Goals: To be accomplished in 2 weeks:                        CMJUYFF will report compliance with HEP at least 1x/day to aid in rehabilitation program.                        IMIFPY at IE: NA                        Current: issued past appt , discussed safety pt doing well working on side step in kitchen with counter for assist                             Patient will decrease TUG by 5 seconds to display MCID and progression to decreased falls risk.                        Status at IE: 18.8 seconds                        Current: 21secs (increase in time, worsening) prior session           Long Term Goals: To be accomplished in 6 weeks:                        Patient will increase strength to 5/5 throughout B LEs to aid in return recreational activities and ADLs.                       UYXVZF at IE: 4/5 grossly                        Current: n/a                            Patient will improve Tinetti score to 19 or greater to demonstrate decreased risk for falls.                        Status at IE: 16                        Current: n/a                            Patient will ambulate 1000ft on level surface with LRAD and normalized gait.                       VLLTXU at IE:na                        Current: n/a                            Patient will improve FOTO to 46 points overall to demonstrate improvement in functional ability.                          PQGPSC at IE:40                        GWYPQPN: 77/309 ( 1/29/2018)    PLAN  []  Upgrade activities as tolerated     [x]  Continue plan of care  []  Update interventions per flow sheet []  Discharge due to:_  []  Other:_      Eric Smith PT, DPT 2/13/2018  10:14 AM    Future Appointments  Date Time Provider Quintin Trang   2/15/2018 10:00 AM Eric Smith PT [facility]   2/20/2018 10:00 AM Eric Smith PT [facility]   2/22/2018 10:00 AM Eric Smith PT [facility]   4/26/2018 10:00 AM THE [facility] INFUSION NURSE 1 [facility]

## 2018-02-15 ENCOUNTER — HOSPITAL ENCOUNTER (OUTPATIENT)
Dept: PHYSICAL THERAPY | Age: 83
Discharge: HOME | End: 2018-02-15
Payer: MEDICARE

## 2018-02-15 PROCEDURE — 97110 THERAPEUTIC EXERCISES: CPT | Performed by: PHYSICAL THERAPIST

## 2018-02-15 PROCEDURE — 97112 NEUROMUSCULAR REEDUCATION: CPT | Performed by: PHYSICAL THERAPIST

## 2018-02-15 NOTE — PROGRESS NOTES
PT DAILY TREATMENT NOTE - [facility]     Patient Name: Steph Sams  Date:2/15/2018  : 1935  [x]  Patient  Verified  Payor: [employer] / Plan: [place] MEDICARE PART A & B / Product Type: Medicare /    In time:9:50  Out time:10:30  Total Treatment Time (min): 40  Total Timed Codes (min): 40   1:1 Treatment Time ( [address] only): 31   Visit #: 10 of 12    Treatment Area: Other symptoms and signs involving the musculoskeletal system [R29.898]    SUBJECTIVE  Pain Level (0-10 scale): 0  Any medication changes, allergies to medications, adverse drug reactions, diagnosis change, or new procedure performed?: [x] No    [] Yes (see summary sheet for update)  Subjective functional status/changes:   [] No changes reported  Feels alright. No new issues    OBJECTIVE    8 min Therapeutic Exercise:  [x] See flow sheet :   Rationale: increase ROM and increase strength to improve the patients ability to complete ADLs    23 min Therapeutic Activity:  [x]  See flow sheet :   Rationale: improve coordination, improve balance and increase proprioception  to improve the patients ability to complete ADLs and decrease falls risk          With   [] TE   [] TA   [] neuro   [] other: Patient Education: [x] Review HEP    [] Progressed/Changed HEP based on:   [] positioning   [] body mechanics   [] transfers   [] heat/ice application    [] other:        Pain Level (0-10 scale) post treatment: 0    ASSESSMENT/Changes in Function: Patient responds well to treatment session. Continues to be challenged by SLS during balance activites. Educated on balance strategies. Pt was receptive.  . No adverse effects were noted from today's treatment session      Patient will continue to benefit from skilled PT services to modify and progress therapeutic interventions, address functional mobility deficits, address strength deficits, analyze and cue movement patterns, analyze and modify body mechanics/ergonomics, assess and modify postural abnormalities, address imbalance/dizziness and instruct in home and community integration to attain remaining goals. []  See Plan of Care  []  See progress note/recertification  []  See Discharge Summary         Progress towards goals / Updated goals:  Short Term Goals: To be accomplished in 2 weeks:                        AEXQQPX will report compliance with HEP at least 1x/day to aid in rehabilitation program.                        SJVAWJ at IE: NA                        Current: issued past appt , discussed safety pt doing well working on side step in kitchen with counter for assist                             Patient will decrease TUG by 5 seconds to display MCID and progression to decreased falls risk.                        Status at IE: 18.8 seconds                        Current: 21secs (increase in time, worsening) prior session           Long Term Goals: To be accomplished in 6 weeks:                        Patient will increase strength to 5/5 throughout B LEs to aid in return recreational activities and ADLs.                       VXCQQW at IE: 4/5 grossly                        Current: n/a                            Patient will improve Tinetti score to 19 or greater to demonstrate decreased risk for falls.                        Status at IE: 16                        Current: n/a                            Patient will ambulate 1000ft on level surface with LRAD and normalized gait.                       YYBSTS at IE:100ft                        Current: 300ft with no AD, shuffling gait pattern, Progressing                            Patient will improve FOTO to 46 points overall to demonstrate improvement in functional ability.                          CRLBJA at IE:40                        ZPHGGYQ: 19/756 ( 1/29/2018)    PLAN  []  Upgrade activities as tolerated     [x]  Continue plan of care  []  Update interventions per flow sheet       []  Discharge due to:_  []  Other:_      Yann Chacon, PT, DPT 2/15/2018  9:56 AM    Future Appointments  Date Time Provider Quintin Trang   2/15/2018 10:00 AM Lu Meneses PT [facility]   2/20/2018 10:00 AM Lu Meneses PT [facility]   2/22/2018 10:00 AM Lu Meneses PT [facility]   4/26/2018 10:00 AM THE [facility] INFUSION NURSE 1 [facility]

## 2018-02-20 ENCOUNTER — APPOINTMENT (OUTPATIENT)
Dept: PHYSICAL THERAPY | Age: 83
End: 2018-02-20
Payer: MEDICARE

## 2018-02-22 ENCOUNTER — HOSPITAL ENCOUNTER (OUTPATIENT)
Dept: PHYSICAL THERAPY | Age: 83
Discharge: HOME | End: 2018-02-22
Payer: MEDICARE

## 2018-02-22 PROCEDURE — G8979 MOBILITY GOAL STATUS: HCPCS | Performed by: PHYSICAL THERAPIST

## 2018-02-22 PROCEDURE — G8978 MOBILITY CURRENT STATUS: HCPCS | Performed by: PHYSICAL THERAPIST

## 2018-02-22 PROCEDURE — 97112 NEUROMUSCULAR REEDUCATION: CPT | Performed by: PHYSICAL THERAPIST

## 2018-02-22 NOTE — PROGRESS NOTES
PT DAILY TREATMENT NOTE - [facility]     Patient Name: Xiomara Begum  Date:2018  : 1935  [x]  Patient  Verified  Payor: Cee Mins / Plan: [place] MEDICARE PART A & B / Product Type: Medicare /    In time:9:50  Out time:10:30  Total Treatment Time (min): 40  Total Timed Codes (min): 30  1:1 Treatment Time ( [address] only): 30   Visit #: 10 of 12 (add 8    Treatment Area: Other symptoms and signs involving the musculoskeletal system [R29.898]    SUBJECTIVE  Pain Level (0-10 scale): 0  Any medication changes, allergies to medications, adverse drug reactions, diagnosis change, or new procedure performed?: [x] No    [] Yes (see summary sheet for update)  Subjective functional status/changes:   [] No changes reported  Feels good. No new issues. Was dizzy last visit, that's why she cancelled. Stated she knew she would not be able to perform the exercises    OBJECTIVE       30 min Neuromuscular Re-education:  [x]  See flow sheet :   Rationale: improve coordination, improve balance and increase proprioception  to improve the patients ability to complete ADLs        With   [] TE   [] TA   [] neuro   [] other: Patient Education: [x] Review HEP    [] Progressed/Changed HEP based on:   [] positioning   [] body mechanics   [] transfers   [] heat/ice application    [] other:      Other Objective/Functional Measures:      Vitals:   BP: 168/81mmHg  HR: 82bpm    BP: 168/78mmHg  HR 83bpm     After Warm-up:   BP: 156/71mmHg  HR: 76    Post session:   BP: 155/76mmHg  HR: 83bpm  Pain Level (0-10 scale) post treatment: 0    ASSESSMENT/Changes in Function: Patient responds well to treatment session. Patient has made good progress to date, reducing from high to a medium falls risk. Her overall endurance appear impaired. Will continue to benefit from skilled PT services to address remaining deficits in decreased activity tolerance, falls risk, decreased strength.  No adverse effects were noted from today's treatment session      Patient will continue to benefit from skilled PT services to modify and progress therapeutic interventions, address functional mobility deficits, address strength deficits, analyze and cue movement patterns, analyze and modify body mechanics/ergonomics, assess and modify postural abnormalities, address imbalance/dizziness and instruct in home and community integration to attain remaining goals. []  See Plan of Care  []  See progress note/recertification  []  See Discharge Summary          Progress towards goals / Updated goals:  Short Term Goals: To be accomplished in 2 weeks:                        SDBGQMY will report compliance with HEP at least 1x/day to aid in rehabilitation program.                        JTSCDX at IE: NA                        Current: issued past appt , discussed safety pt doing well working on side step in kitchen with counter for assist                             Patient will decrease TUG by 5 seconds to display MCID and progression to decreased falls risk.                        Status at IE: 18.8 seconds                        Current: 21secs (increase in time, worsening) prior session           Long Term Goals: To be accomplished in 6 weeks:                        Patient will increase strength to 5/5 throughout B LEs to aid in return recreational activities and ADLs.                       NSBQKD at IE: 4/5 grossly                        Current: 4+/5 grossly, however 4/5 throughout hips in all planes                            Patient will improve Tinetti score to 19 or greater to demonstrate decreased risk for falls.                        Status at IE: 16                        Current: 22/28 medium falls risk                            Patient will ambulate 1000ft on level surface with LRAD and normalized gait.                         XQATRA at IE:100ft                        Current: 360 with no AD, shuffling gait pattern, Progressing                            Patient will improve FOTO to 46 points overall to demonstrate improvement in functional ability.                          DFTQBA at IE:40                        VTEOPIA: 31/450 MET 2/22/2018    PLAN  []  Upgrade activities as tolerated     [x]  Continue plan of care  []  Update interventions per flow sheet       []  Discharge due to:_  []  Other:_      Ame Rodriges PT, DPT 2/22/2018  10:04 AM    Future Appointments  Date Time Provider Quintin Costello   4/26/2018 10:00 AM [address] [place] [address] [facility]

## 2018-02-22 NOTE — PROGRESS NOTES
[facility]  [address], [address] [facility]  Ph [phone]  Fx [phone]    Continued Plan of Care/ Re-certification for Physical Therapy Services    Patient name: Catrachita Soto Start of Care: 2017   Referral source: Mateo Bajwa, * : 1935   Medical/Treatment Diagnosis: Other symptoms and signs involving the musculoskeletal system [R29.898] Onset Date:2017     Prior Hospitalization: see medical history Provider#: [id number]   Medications: Verified on Patient Summary List    Comorbidities: COPD, hypercholesterolemia, stage 3 CKF, DMII, HTN, PVD, CAD  Prior Level of Function:regular walking, no stairs, independent with ADLs    Visits from Start of Care: 10    Missed Visits: 3    The Plan of Care and following information is based on the patient's current status:    Key functional changes: decreased falls risk      Problems/ barriers to goal attainment: NA     Problem List: decrease strength, impaired gait/ balance, decrease ADL/ functional abilitiies, decrease activity tolerance, decrease flexibility/ joint mobility and decrease transfer abilities    Treatment Plan: Therapeutic exercise, Therapeutic activities, Neuromuscular re-education, Physical agent/modality, Gait/balance training, Patient education, Self Care training, Functional mobility training, Home safety training and Stair training       Goals for this certification period to be accomplished in 4 weeks:  Short Term Goals: To be accomplished in 2 weeks:                        Patient will report compliance with HEP at least 1x/day to aid in rehabilitation program.                        Status at IE: [facility]: issued past appt , discussed safety pt doing well working on side step in kitchen with counter for assist                             Patient will decrease TUG by 5 seconds to display MCID and progression to decreased falls risk.                        Status at IE: 18.8 seconds                        Current: 21secs (increase in time, worsening) prior session           Long Term Goals: To be accomplished in 6 weeks:                        Patient will increase strength to 5/5 throughout B LEs to aid in return recreational activities and ADLs.                       GJPLGP at IE: 4/5 grossly                        Current: 4+/5 grossly, however 4/5 throughout hips in all planes                            Patient will improve Tinetti score to 19 or greater to demonstrate decreased risk for falls.                        Status at IE: 16                        Current: 22/28 medium falls risk                            Patient will ambulate 1000ft on level surface with LRAD and normalized gait.                       JRVZWK at IE:100ft                        Current: 360 with no AD, shuffling gait pattern, Progressing                            Patient will improve FOTO to 46 points overall to demonstrate improvement in functional ability.                       EQQMEI at IE:40                        AKIWYYC: 16/389 MET 2/22/2018    Frequency / Duration: Patient to be seen 2 times per week for 4 weeks:    Assessment / Recommendations:Patient responds well to treatment session. Patient has made good progress to date, reducing from high to a medium falls risk. Her overall endurance appear impaired. Will continue to benefit from skilled PT services to address remaining deficits in decreased activity tolerance, falls risk, decreased strength.  No adverse effects were noted from today's treatment session     Patient will continue to benefit from skilled PT services to modify and progress therapeutic interventions, address functional mobility deficits, address strength deficits, analyze and cue movement patterns, analyze and modify body mechanics/ergonomics, assess and modify postural abnormalities, address imbalance/dizziness and instruct in home and community integration to attain remaining goals. G-Codes (GP)  Mobility  [id number] Current  CL= 60-79%  [id number] Goal  CK= 40-59%    The severity rating is based on clinical judgment and the FOTO score. Certification Period: 2/22/2018 - 3/22/2018    Savage Jackson PT, DPT 2/22/2018 5:06 PM    ________________________________________________________________________  I certify that the above Therapy Services are being furnished while the patient is under my care. I agree with the treatment plan and certify that this therapy is necessary. [] I have read the above and request that my patient continue as recommended.   [] I have read the above report and request that my patient continue therapy with the following changes/special instructions: _______________________________________  [] I have read the above report and request that my patient be discharged from therapy    Physician's Signature:________________________________Date:___________Time:__________    Please sign and return to [facility]  [address], [address] [facility]  Ph [phone]  Fx [phone]

## 2018-02-26 ENCOUNTER — HOSPITAL ENCOUNTER (OUTPATIENT)
Dept: PHYSICAL THERAPY | Age: 83
Discharge: HOME | End: 2018-02-26
Payer: MEDICARE

## 2018-02-26 PROCEDURE — 97112 NEUROMUSCULAR REEDUCATION: CPT | Performed by: PHYSICAL THERAPIST

## 2018-02-26 NOTE — PROGRESS NOTES
PT DAILY TREATMENT NOTE - [facility]     Patient Name: Francoise Zay  Date:2018  : 1935  [x]  Patient  Verified  Payor: Ana Bile / Plan: [place] MED[place] A & B / Product Type: Medicare /    In time:10:00  Out time:10:32  Total Treatment Time (min): 32  Total Timed Codes (min): 32  1:1 Treatment Time ( [address] only): 32   Visit #: 12 of 20    Treatment Area: Other symptoms and signs involving the musculoskeletal system [R29.898]    SUBJECTIVE  Pain Level (0-10 scale): 0  Any medication changes, allergies to medications, adverse drug reactions, diagnosis change, or new procedure performed?: [x] No    [] Yes (see summary sheet for update)  Subjective functional status/changes:   [] No changes reported  Feels alright. No new issues    OBJECTIVE    32 min Neuromuscular Re-education:  [x]  See flow sheet :   Rationale: improve coordination, improve balance and increase proprioception  to improve the patients ability to complete ADLs and decrease falls risk        With   [] TE   [] TA   [] neuro   [] other: Patient Education: [x] Review HEP    [] Progressed/Changed HEP based on:   [] positioning   [] body mechanics   [] transfers   [] heat/ice application    [] other:        Pain Level (0-10 scale) post treatment: 0     ASSESSMENT/Changes in Function: Patient responds well to treatment session. Displays decreased endurance today secondary to lots of walking the yesterday the the commissary. Continue to focus on endurance and balance training . No adverse effects were noted from today's treatment session      Patient will continue to benefit from skilled PT services to modify and progress therapeutic interventions, address functional mobility deficits, address ROM deficits, address strength deficits, analyze and address soft tissue restrictions, analyze and cue movement patterns, analyze and modify body mechanics/ergonomics and assess and modify postural abnormalities to attain remaining goals.      []  See Plan of Care  []  See progress note/recertification  []  See Discharge Summary         Progress towards goals / Updated goals:  Short Term Goals: To be accomplished in 2 weeks:                        YADIEL will report compliance with HEP at least 1x/day to aid in rehabilitation program.                        AZGDEQ at IE: NA                        Current: issued past appt , discussed safety pt doing well working on side step in kitchen with counter for assist                             Patient will decrease TUG by 5 seconds to display MCID and progression to decreased falls risk.                        Status at IE: 18.8 seconds                        Current: 21secs (increase in time, worsening) prior session           Long Term Goals: To be accomplished in 6 weeks:                        Patient will increase strength to 5/5 throughout B LEs to aid in return recreational activities and ADLs.                       GFHHNU at IE: 4/5 grossly                        Current: 4+/5 grossly, however 4/5 throughout hips in all planes                            Patient will improve Tinetti score to 19 or greater to demonstrate decreased risk for falls.                        Status at IE: 16                        Current: 22/28 medium falls risk                            Patient will ambulate 1000ft on level surface with LRAD and normalized gait.                       FZVOSX at IE:100ft                        Current: 360 with no AD, shuffling gait pattern, Progressing                            Patient will improve FOTO to 46 points overall to demonstrate improvement in functional ability.                          GSWXKX at IE:40                        FILIOCM: 86/982 MET 2/22/2018    PLAN  [x]  Upgrade activities as tolerated     [x]  Continue plan of care  []  Update interventions per flow sheet       []  Discharge due to:_  []  Other:_      Summer Lopez, PT, DPT 2/26/2018  10:03 AM    Future Appointments  Date Time Provider Quintin Trang   2/28/2018 10:00 AM Maryl Pyo, PT [facility] [address]   2/28/2018 5:30 PM Maryl Pyo, [facility] [address] [address] [address]   3/6/2018 1:30 PM Maryl Pyo, PT [facility] [address] [facility] [address] [address]   3/8/2018 10:00 AM Maryl Pyo, [facility] [address] [facility] [address]   3/13/2018 9:30 AM Maryl Pyo, [facility] [address] [facility] [facility] [address]   3/15/2018 9:30 AM Maryl Pyo, [facility] [address] [facility] [address]   3/19/2018 10:00 AM Maryl Pyo, [facility] [address] [facility] [address]   3/21/2018 10:00 AM Maryl Pyo, [facility] [address] [facility] [address]   4/26/2018 10:00 AM [facility] [place] [facility]URSE 1 [facility] [address] [facility] [place]

## 2018-02-28 ENCOUNTER — HOSPITAL ENCOUNTER (OUTPATIENT)
Dept: PHYSICAL THERAPY | Age: 83
Discharge: HOME | End: 2018-02-28
Payer: MEDICARE

## 2018-02-28 ENCOUNTER — APPOINTMENT (OUTPATIENT)
Dept: PHYSICAL THERAPY | Age: 83
End: 2018-02-28
Payer: MEDICARE

## 2018-02-28 PROCEDURE — 97112 NEUROMUSCULAR REEDUCATION: CPT | Performed by: PHYSICAL THERAPIST

## 2018-02-28 PROCEDURE — 97110 THERAPEUTIC EXERCISES: CPT | Performed by: PHYSICAL THERAPIST

## 2018-02-28 NOTE — PROGRESS NOTES
PT DAILY TREATMENT NOTE - [facility]     Patient Name: Isabel Martin  Date:2018  : 1935  [x]  Patient  Verified  Payor: Joe Human / Plan: [place] MEDICARE PART A & B / Product Type: Medicare /    In time:10:00  Out time:10:38  Total Treatment Time (min): 38  Total Timed Codes (min): 38  1:1 Treatment Time ( [address] only): 38   Visit #: 12 of 20    Treatment Area: Other symptoms and signs involving the musculoskeletal system [R29.898]    SUBJECTIVE  Pain Level (0-10 scale): 0  Any medication changes, allergies to medications, adverse drug reactions, diagnosis change, or new procedure performed?: [x] No    [] Yes (see summary sheet for update)  Subjective functional status/changes:   [] No changes reported  Feels tired. OBJECTIVE    8 min Therapeutic Exercise:  [] See flow sheet :   Rationale: increase strength and improve coordination to improve the patients ability to complete ADLs    30 min Neuromuscular Re-education:  [x]  See flow sheet :   Rationale: improve coordination, improve balance and increase proprioception  to improve the patients ability to decrease falls risk, and complete ADLs          With   [] TE   [] TA   [] neuro   [] other: Patient Education: [x] Review HEP    [] Progressed/Changed HEP based on:   [] positioning   [] body mechanics   [] transfers   [] heat/ice application    [] other:        Pain Level (0-10 scale) post treatment: 0     ASSESSMENT/Changes in Function: Patient responds well to treatment session. Continues to be challenged by endurance activities. Along with SLS activities. Should focus on reducing dependency on UEs.  No adverse effects were noted from today's treatment session      Patient will continue to benefit from skilled PT services to modify and progress therapeutic interventions, address functional mobility deficits, address ROM deficits, address strength deficits, analyze and address soft tissue restrictions, analyze and cue movement patterns, analyze and modify body mechanics/ergonomics and assess and modify postural abnormalities to attain remaining goals. []  See Plan of Care  []  See progress note/recertification  []  See Discharge Summary         Progress towards goals / Updated goals:  Short Term Goals: To be accomplished in 2 weeks:                        XTRGKBB will report compliance with HEP at least 1x/day to aid in rehabilitation program.                        AMWTHR at IE: NA                        Current: issued past appt , discussed safety pt doing well working on side step in kitchen with counter for assist                             Patient will decrease TUG by 5 seconds to display MCID and progression to decreased falls risk.                        Status at IE: 18.8 seconds                        Current: 21secs (increase in time, worsening) prior session           Long Term Goals: To be accomplished in 6 weeks:                        Patient will increase strength to 5/5 throughout B LEs to aid in return recreational activities and ADLs.                       HGFBEM at IE: 4/5 grossly                        Current: 4+/5 grossly, however 4/5 throughout hips in all planes                            Patient will improve Tinetti score to 19 or greater to demonstrate decreased risk for falls.                        Status at IE: 16                        Current: 22/28 medium falls risk                            Patient will ambulate 1000ft on level surface with LRAD and normalized gait.                       YBYLKF at IE:100ft                        Current: 360 with no AD, shuffling gait pattern, Progressing                            Patient will improve FOTO to 46 points overall to demonstrate improvement in functional ability.                          HHNKNP at IE:40                        IQHRTKS: 31/202 MET 2/22/2018    PLAN  []  Upgrade activities as tolerated     [x]  Continue plan of care  []  Update interventions per flow sheet []  Discharge due to:_  []  Other:_      Ibis Brown PT, DPT 2/28/2018  10:00 AM    Future Appointments  Date Time Provider Quintin Trang   3/6/2018 1:30 PM Ibis Brown PT [facility]   3/8/2018 10:00 AM Ibis Brown PT [facility]   3/13/2018 9:30 AM Ibis Brown PT [facility]   3/15/2018 9:30 AM Ibis Brown PT [facility]   3/19/2018 10:00 AM Ibis Brown PT [facility]   3/21/2018 10:00 AM Ibis Brown PT [facility]   4/26/2018 10:00 AM THE [facility] INFUSION NURSE 1 [facility] THE [facility]

## 2018-03-05 ENCOUNTER — APPOINTMENT (OUTPATIENT)
Dept: PHYSICAL THERAPY | Age: 83
End: 2018-03-05
Payer: MEDICARE

## 2018-03-06 ENCOUNTER — APPOINTMENT (OUTPATIENT)
Dept: PHYSICAL THERAPY | Age: 83
End: 2018-03-06
Payer: MEDICARE

## 2018-03-07 ENCOUNTER — APPOINTMENT (OUTPATIENT)
Dept: PHYSICAL THERAPY | Age: 83
End: 2018-03-07
Payer: MEDICARE

## 2018-03-08 ENCOUNTER — HOSPITAL ENCOUNTER (OUTPATIENT)
Dept: PHYSICAL THERAPY | Age: 83
Discharge: HOME | End: 2018-03-08
Payer: MEDICARE

## 2018-03-08 PROCEDURE — 97530 THERAPEUTIC ACTIVITIES: CPT | Performed by: PHYSICAL THERAPIST

## 2018-03-08 PROCEDURE — 97110 THERAPEUTIC EXERCISES: CPT | Performed by: PHYSICAL THERAPIST

## 2018-03-08 NOTE — PROGRESS NOTES
PT DAILY TREATMENT NOTE - [facility]     Patient Name: Cephus Apgar  Date:3/8/2018  : 1935  [x]  Patient  Verified  Payor: [place] MEDICARE / Plan: [place] MEDICARE PART A & B / Product Type: Medicare /    In time:9:40 Out time:10:30  Total Treatment Time (min): 50  Total Timed Codes (min): 38  1:1 Treatment Time ( [address] only): 38   Visit #: 13 of 20    Treatment Area: Other symptoms and signs involving the musculoskeletal system [R29.898]    SUBJECTIVE  Pain Level (0-10 scale): 7  Any medication changes, allergies to medications, adverse drug reactions, diagnosis change, or new procedure performed?: [x] No    [] Yes (see summary sheet for update)  Subjective functional status/changes:   [] No changes reported  Feels so-so. The arthritis pain is flared up today, but feels better with NSAIDs    OBJECTIVE    30 min Therapeutic Exercise:  [x] See flow sheet :   Rationale: increase ROM, increase strength and endurance to improve the patients ability to complete ADLs    8 min Therapeutic Activity:  [x]  See flow sheet :   Rationale: increase ROM, increase strength and improve coordination  to improve the patients ability to complete ADLs             With   [] TE   [] TA   [] neuro   [] other: Patient Education: [x] Review HEP    [] Progressed/Changed HEP based on:   [] positioning   [] body mechanics   [] transfers   [] heat/ice application    [] other:      Other Objective/Functional Measures:   Vitals:   151/72 mmHg  82bpm  Prior to exercise. Thoracic pain is TTP about the right rhomboid and levator scapula. Pain Level (0-10 scale) post treatment: 0    ASSESSMENT/Changes in Function: Patient responds well to treatment session. Patient has apparent MSK in neck and upper thoracic on the R side. She has TTP and reports that continued palpation (like massage) alleviates symptoms. Will monitor symptoms next visit. Continue to progress with strengthening and endurance exercises.  No adverse effects were noted from today's treatment session      Patient will continue to benefit from skilled PT services to modify and progress therapeutic interventions, address functional mobility deficits, address ROM deficits, address strength deficits, analyze and cue movement patterns, analyze and modify body mechanics/ergonomics, assess and modify postural abnormalities, address imbalance/dizziness and instruct in home and community integration to attain remaining goals. []  See Plan of Care  []  See progress note/recertification  []  See Discharge Summary         Progress towards goals / Updated goals:  Short Term Goals: To be accomplished in 2 weeks:                        LIZZIE will report compliance with HEP at least 1x/day to aid in rehabilitation program.                        AVAYZE at IE: NA                        Current: issued past appt , discussed safety pt doing well working on side step in kitchen with counter for assist                             Patient will decrease TUG by 5 seconds to display MCID and progression to decreased falls risk.                        Status at IE: 18.8 seconds                        Current: 21secs (increase in time, worsening) prior session           Long Term Goals: To be accomplished in 6 weeks:                        Patient will increase strength to 5/5 throughout B LEs to aid in return recreational activities and ADLs.                       HKCIHI at IE: 4/5 grossly                        Current: 4+/5 grossly, however 4/5 throughout hips in all planes                            Patient will improve Tinetti score to 19 or greater to demonstrate decreased risk for falls.                        Status at IE: 16                        Current: 22/28 medium falls risk                            Patient will ambulate 1000ft on level surface with LRAD and normalized gait.                         DWPRCT at IE:100ft                        Current: 360 with no AD, shuffling gait pattern, Progressing                            Patient will improve FOTO to 46 points overall to demonstrate improvement in functional ability.                          JSKJIX at IE:40                        JZIFLGT: 35/267 MET 2/22/2018    PLAN  []  Upgrade activities as tolerated     [x]  Continue plan of care  []  Update interventions per flow sheet       []  Discharge due to:_  []  Other:_      Rosalia Álvarez PT, DPT 3/8/2018  9:51 AM    Future Appointments  Date Time Provider Quintin Costello   3/8/2018 10:00 AM Rosalia Álvaerz PT [facility]   3/15/2018 9:30 AM Rosalia Álvarez PT [facility]   3/19/2018 10:00 AM Rosalia Álvarez PT [facility]   3/21/2018 10:00 AM Rosalia Álvarez [facility]   4/26/2018 10:00 AM THE [facility] INFUSION NURSE 1 [facility]

## 2018-03-13 ENCOUNTER — APPOINTMENT (OUTPATIENT)
Dept: PHYSICAL THERAPY | Age: 83
End: 2018-03-13
Payer: MEDICARE

## 2018-03-15 ENCOUNTER — HOSPITAL ENCOUNTER (OUTPATIENT)
Dept: PHYSICAL THERAPY | Age: 83
Discharge: HOME | End: 2018-03-15
Payer: MEDICARE

## 2018-03-15 PROCEDURE — 97110 THERAPEUTIC EXERCISES: CPT | Performed by: PHYSICAL THERAPIST

## 2018-03-15 PROCEDURE — G8978 MOBILITY CURRENT STATUS: HCPCS | Performed by: PHYSICAL THERAPIST

## 2018-03-15 PROCEDURE — 97530 THERAPEUTIC ACTIVITIES: CPT | Performed by: PHYSICAL THERAPIST

## 2018-03-15 PROCEDURE — G8979 MOBILITY GOAL STATUS: HCPCS | Performed by: PHYSICAL THERAPIST

## 2018-03-15 NOTE — PROGRESS NOTES
PT DAILY TREATMENT NOTE - [facility]     Patient Name: Vinny Ferguson  Date:3/15/2018  : 1935  [x]  Patient  Verified  Payor: Brenda Offer / Plan: [place] MEDICARE PART A & B / Product Type: Medicare /    In time:9:25  Out time:10:13  Total Treatment Time (min): 48  Total Timed Codes (min): 48  1:1 Treatment Time ( [address] only): 48   Visit #: 14 of 20    Treatment Area: Other symptoms and signs involving the musculoskeletal system [R29.898]    SUBJECTIVE  Pain Level (0-10 scale): 0  Any medication changes, allergies to medications, adverse drug reactions, diagnosis change, or new procedure performed?: [x] No    [] Yes (see summary sheet for update)  Subjective functional status/changes:   [] No changes reported  Feels okay. No new issues. Does have pain in the neck today, but it is minimal    OBJECTIVE    23 min Therapeutic Exercise:  [x] See flow sheet :   Rationale: increase ROM, increase strength, improve coordination, improve balance and increase proprioception to improve the patients ability to complete ADLs    25 min Therapeutic Activity:  [x]  See flow sheet :   Rationale: increase ROM, increase strength, improve coordination and ensure patient safety  to improve the patients ability to complete ADLs          With   [] TE   [] TA   [] neuro   [] other: Patient Education: [x] Review HEP    [] Progressed/Changed HEP based on:   [] positioning   [] body mechanics   [] transfers   [] heat/ice application    [] other:      Other Objective/Functional Measures:     Vitals Before exercise After exercise   Heart Rate 78bpm 82bpm   Blood pressure 158/73 mmHg 139/66 mmHg          Pain Level (0-10 scale) post treatment: 0    ASSESSMENT/Changes in Function: Patient responds well to treatment session. Patient is progressing slowly due to inconsistent appointment times, however, has concurrent symptoms of CAD. She experiences dizziness/lightheadedness with exertion, and Systolic BP drop of 10BLIC with exercise.  This is a contraindication to exercise without clearance from the patient's cardiologist. Cedrick Bloodjenniferod patient to follow-up with cardiologist and will hold therapy until cleared for this issue. Otherwise will continue to benefit from skilled PT services to address remaining deficits with regard to decrease balance, falls risk, decreased strength and functional gait abnormality. No adverse effects were noted from today's treatment session      Patient will continue to benefit from skilled PT services to modify and progress therapeutic interventions, address functional mobility deficits, address ROM deficits, address strength deficits, analyze and address soft tissue restrictions, analyze and cue movement patterns, analyze and modify body mechanics/ergonomics and assess and modify postural abnormalities to attain remaining goals. []  See Plan of Care  [x]  See progress note/recertification  []  See Discharge Summary         Progress towards goals / Updated goals:  Short Term Goals: To be accomplished in 2 weeks:                        [facility] compliance with HEP at least 1x/day to aid in rehabilitation program.                        JERONIMOGR at IE: NA                        Current: issued past appt , discussed safety pt doing well working on side step in kitchen with counter for assist                             Patient will decrease TUG by 5 seconds to display MCID and progression to decreased falls risk.                        Status at IE: 18.8 seconds                        Current: 21secs (increase in time, worsening) prior session           Long Term Goals: To be accomplished in 6 weeks:                        Patient will increase strength to 5/5 throughout B LEs to aid in return recreational activities and ADLs.                         CTNYDN at IE: 4/5 grossly                        Current: 4+/5 grossly, however 4/5 throughout hips in all planes                            Patient will improve Tinetti score to 19 or greater to demonstrate decreased risk for falls.                        Status at IE: 16                        Current: 22/28 medium falls risk unchanged 3/15/2018                            Patient will ambulate 1000ft on level surface with LRAD and normalized gait.                       HCMYFF at IE:100ft                        Current: 360 with no AD, shuffling gait pattern, unchanged since last note 3-weeks ago                            Patient will improve FOTO to 46 points overall to demonstrate improvement in functional ability.                          KLWNCI at IE:40                        IQTENHK: 47/459 MET 2/22/2018    PLAN  []  Upgrade activities as tolerated     [x]  Continue plan of care  []  Update interventions per flow sheet       []  Discharge due to:_  []  Other:_      Jarrell Amado PT, DPT 3/15/2018  9:36 AM    Future Appointments  Date Time Provider Quintin Costello   3/19/2018 10:00 AM Jarrell Amado PT [facility]   3/21/2018 10:00 AM Jarrell Amado PT [facility]   4/26/2018 10:00 AM [facility] INFUSION NURSE 1 AMBER Pay[facility] THE [facility]

## 2018-03-15 NOTE — PROGRESS NOTES
[facility]  [address] [place], [address] [facility]  Ph [phone]  Fx [phone]    Medicare Progress Report    Patient name: Flaqutio Saucedo     Start of Care: 2017  Referral source: Jordon Hardy MD (cardiology) : 1935  Medical/Treatment Diagnosis: Other symptoms and signs involving the musculoskeletal system [R29.898]  Onset Date:2017  Prior Hospitalization: see medical history   Provider#: [id number]  Comorbidities: COPD, hypercholesterolemia, stage 3 CKF, DMII, HTN, PVD, CAD  Prior Level of Function:regular walking, no stairs, independent with ADLs  Medications: Verified on Patient Summary List    Visits from Start of Care: 14    Missed Visits: 3  Reporting Period : 2018 to 3/15/2018      Subjective Reports: Feels okay. No new issues. Does have pain in the neck today, but it is minimal. Does have mid back pain, worse with activity at times. Key functional changes: NA, early progress note due to medical necessity and communication      Problems/ barriers to goal attainment: comorbidity      Problem List: pain affecting function, decrease ROM, decrease strength, impaired gait/ balance, decrease ADL/ functional abilitiies and decrease activity tolerance   Treatment Plan: Therapeutic exercise, Therapeutic activities, Neuromuscular re-education, Physical agent/modality, Gait/balance training, Manual therapy, Patient education, Self Care training and Stair training    Assessment / Recommendations:Patient is progressing slowly due to inconsistent appointment times, however, has concurrent symptoms of CAD. She experiences dizziness/lightheadedness with exertion, and Systolic BP drop of 61SMCL with exercise. This is a contraindication to exercise without clearance from the patient's cardiologist. Dong Gonzalez patient to follow-up with cardiologist and will hold therapy until cleared for this issue.  Otherwise will continue to benefit from skilled PT services to address remaining deficits with regard to decrease balance, falls risk, decreased strength and functional gait abnormality. Updated Goals to be accomplished in 4 weeks IF CLEARED BY CARDIOLOGIST:  [address] be accomplished in 6 weeks:                        ZUGWNLL will increase strength to 5/5 throughout B LEs to aid in return recreational activities and ADLs.                       EFXBNJ at IE: 4/5 grossly                        Current: 4+/5 grossly, however 4/5 throughout hips in all planes                            Patient will improve Tinetti score to 19 or greater to demonstrate decreased risk for falls.                        Status at IE: 16                        Current: 22/28 medium falls risk unchanged 3/15/2018                            Patient will ambulate 1000ft on level surface with LRAD and normalized gait.                       BHQHIN at IE:100ft                        Current: 360 with no AD, shuffling gait pattern, unchanged since last note 3-weeks ago                            Patient will improve FOTO to 46 points overall to demonstrate improvement in functional ability.                       SKYEZ at IE:40                        Current: 48/100 MET 2/22/2018    Frequency / Duration: Patient to be seen 2 times per week for 4 weeks:    G-Codes (GP)  Mobility  [id number] Current  CL= 60-79%   Goal  CK= 40-59%    The severity rating is based on clinical judgment and the FOTO score.       Rio Sanderson, PT, DPT 3/15/2018 12:20 PM        .Bryan Stallings

## 2018-03-19 ENCOUNTER — APPOINTMENT (OUTPATIENT)
Dept: PHYSICAL THERAPY | Age: 83
End: 2018-03-19
Payer: MEDICARE

## 2018-03-21 ENCOUNTER — APPOINTMENT (OUTPATIENT)
Dept: PHYSICAL THERAPY | Age: 83
End: 2018-03-21
Payer: MEDICARE

## 2018-04-26 ENCOUNTER — HOSPITAL ENCOUNTER (OUTPATIENT)
Dept: INFUSION THERAPY | Age: 83
Discharge: HOME | End: 2018-04-26
Payer: MEDICARE

## 2018-04-26 VITALS
TEMPERATURE: 97.7 F | RESPIRATION RATE: 18 BRPM | SYSTOLIC BLOOD PRESSURE: 145 MMHG | DIASTOLIC BLOOD PRESSURE: 55 MMHG | HEART RATE: 72 BPM | OXYGEN SATURATION: 95 %

## 2018-04-26 PROCEDURE — 74011250636 HC RX REV CODE- 250/636

## 2018-04-26 PROCEDURE — 96372 THER/PROPH/DIAG INJ SC/IM: CPT

## 2018-04-26 RX ADMIN — DENOSUMAB 60 MG: 60 INJECTION SUBCUTANEOUS at 10:10

## 2018-04-26 NOTE — PROGRESS NOTES
[facility] OPI Progress Note    Date: 2018    Name: Wu Kothari    MRN: [id number]         : 1935      Ms. Truong arrived to [facility] at 56, via wheelchair, \" they thought it would be best if I rode up in a wheelchair, denies any recent falls. \" Pt also states she has not experienced any reactions or complications from Prolia. Ms. Braden Young was assessed and education was provided. Ms. Berny Jhaveri vitals were reviewed. Visit Vitals    /55 (BP 1 Location: Left arm, BP Patient Position: Sitting)    Pulse 72    Temp 97.7 °F (36.5 °C)    Resp 18    SpO2 95%    Breastfeeding No       Prolia   60 mg was administered as ordered SQ in patient's Right upper arm covered with bandaid    Ms. Truong tolerated well without complaints. Ms. Braden Young was discharged from [place] [address] in stable condition at [phone]. Escorted her to lobby via wheelchair, patient requested to dropped off there so she could get lunch then call her trransportation. Pt is scheduled to return on 10/25/2018 @ 1000 for Prolia, will see PCP prior to appointment for updated labs and renewal of prescription.     Tobin Cunningham RN  2018

## 2018-06-07 ENCOUNTER — APPOINTMENT (OUTPATIENT)
Dept: GENERAL RADIOLOGY | Age: 83
DRG: 863 | End: 2018-06-07
Attending: EMERGENCY MEDICINE
Payer: MEDICARE

## 2018-06-07 ENCOUNTER — HOSPITAL ENCOUNTER (INPATIENT)
Age: 83
LOS: 4 days | Discharge: NURSING, REHAB OR LONG-TERM CARE | DRG: 863 | End: 2018-06-11
Attending: EMERGENCY MEDICINE | Admitting: INTERNAL MEDICINE
Payer: MEDICARE

## 2018-06-07 DIAGNOSIS — E83.42 HYPOMAGNESEMIA: Primary | ICD-10-CM

## 2018-06-07 DIAGNOSIS — R26.2 AMBULATORY DYSFUNCTION: ICD-10-CM

## 2018-06-07 PROBLEM — N17.9 AKI (ACUTE KIDNEY INJURY) (HCC): Status: ACTIVE | Noted: 2018-06-07

## 2018-06-07 PROBLEM — L03.90 CELLULITIS: Status: ACTIVE | Noted: 2018-06-07

## 2018-06-07 LAB
ALBUMIN SERPL-MCNC: 3.1 G/DL (ref 3.4–5)
ALBUMIN/GLOB SERPL: 0.8 {RATIO} (ref 0.8–1.7)
ALP SERPL-CCNC: 70 U/L (ref 45–117)
ALT SERPL-CCNC: 35 U/L (ref 13–56)
ANION GAP SERPL CALC-SCNC: 14 MMOL/L (ref 3–18)
AST SERPL-CCNC: 26 U/L (ref 15–37)
BASOPHILS # BLD: 0 K/UL (ref 0–0.06)
BASOPHILS NFR BLD: 0 % (ref 0–2)
BILIRUB SERPL-MCNC: 0.3 MG/DL (ref 0.2–1)
BNP SERPL-MCNC: 645 PG/ML (ref 0–1800)
BUN SERPL-MCNC: 86 MG/DL (ref 7–18)
BUN/CREAT SERPL: 41 (ref 12–20)
CALCIUM SERPL-MCNC: 7.3 MG/DL (ref 8.5–10.1)
CHLORIDE SERPL-SCNC: 100 MMOL/L (ref 100–108)
CO2 SERPL-SCNC: 24 MMOL/L (ref 21–32)
CREAT SERPL-MCNC: 2.12 MG/DL (ref 0.6–1.3)
DIFFERENTIAL METHOD BLD: ABNORMAL
EOSINOPHIL # BLD: 0 K/UL (ref 0–0.4)
EOSINOPHIL NFR BLD: 0 % (ref 0–5)
ERYTHROCYTE [DISTWIDTH] IN BLOOD BY AUTOMATED COUNT: 14 % (ref 11.6–14.5)
EST. AVERAGE GLUCOSE BLD GHB EST-MCNC: 166 MG/DL
GLOBULIN SER CALC-MCNC: 4.1 G/DL (ref 2–4)
GLUCOSE SERPL-MCNC: 295 MG/DL (ref 74–99)
HBA1C MFR BLD: 7.4 % (ref 4.5–5.6)
HCT VFR BLD AUTO: 33.8 % (ref 35–45)
HGB BLD-MCNC: 11.3 G/DL (ref 12–16)
LIPASE SERPL-CCNC: 177 U/L (ref 73–393)
LYMPHOCYTES # BLD: 1.4 K/UL (ref 0.9–3.6)
LYMPHOCYTES NFR BLD: 10 % (ref 21–52)
MAGNESIUM SERPL-MCNC: 0.9 MG/DL (ref 1.6–2.6)
MCH RBC QN AUTO: 28.5 PG (ref 24–34)
MCHC RBC AUTO-ENTMCNC: 33.4 G/DL (ref 31–37)
MCV RBC AUTO: 85.4 FL (ref 74–97)
MONOCYTES # BLD: 1.1 K/UL (ref 0.05–1.2)
MONOCYTES NFR BLD: 8 % (ref 3–10)
NEUTS SEG # BLD: 12 K/UL (ref 1.8–8)
NEUTS SEG NFR BLD: 82 % (ref 40–73)
PLATELET # BLD AUTO: 51 K/UL (ref 135–420)
PMV BLD AUTO: 10.8 FL (ref 9.2–11.8)
POTASSIUM SERPL-SCNC: 3.7 MMOL/L (ref 3.5–5.5)
PROT SERPL-MCNC: 7.2 G/DL (ref 6.4–8.2)
RBC # BLD AUTO: 3.96 M/UL (ref 4.2–5.3)
SODIUM SERPL-SCNC: 138 MMOL/L (ref 136–145)
WBC # BLD AUTO: 14.5 K/UL (ref 4.6–13.2)

## 2018-06-07 PROCEDURE — 74011250636 HC RX REV CODE- 250/636: Performed by: EMERGENCY MEDICINE

## 2018-06-07 PROCEDURE — 85025 COMPLETE CBC W/AUTO DIFF WBC: CPT | Performed by: EMERGENCY MEDICINE

## 2018-06-07 PROCEDURE — 96365 THER/PROPH/DIAG IV INF INIT: CPT

## 2018-06-07 PROCEDURE — 65660000000 HC RM CCU STEPDOWN

## 2018-06-07 PROCEDURE — 83036 HEMOGLOBIN GLYCOSYLATED A1C: CPT | Performed by: INTERNAL MEDICINE

## 2018-06-07 PROCEDURE — 80053 COMPREHEN METABOLIC PANEL: CPT | Performed by: EMERGENCY MEDICINE

## 2018-06-07 PROCEDURE — 83735 ASSAY OF MAGNESIUM: CPT | Performed by: EMERGENCY MEDICINE

## 2018-06-07 PROCEDURE — 74011250636 HC RX REV CODE- 250/636: Performed by: INTERNAL MEDICINE

## 2018-06-07 PROCEDURE — 82550 ASSAY OF CK (CPK): CPT | Performed by: INTERNAL MEDICINE

## 2018-06-07 PROCEDURE — 74011000258 HC RX REV CODE- 258: Performed by: INTERNAL MEDICINE

## 2018-06-07 PROCEDURE — 83690 ASSAY OF LIPASE: CPT | Performed by: EMERGENCY MEDICINE

## 2018-06-07 PROCEDURE — 93005 ELECTROCARDIOGRAM TRACING: CPT

## 2018-06-07 PROCEDURE — 99285 EMERGENCY DEPT VISIT HI MDM: CPT

## 2018-06-07 PROCEDURE — 71046 X-RAY EXAM CHEST 2 VIEWS: CPT

## 2018-06-07 PROCEDURE — 74011000250 HC RX REV CODE- 250: Performed by: INTERNAL MEDICINE

## 2018-06-07 PROCEDURE — 83880 ASSAY OF NATRIURETIC PEPTIDE: CPT | Performed by: EMERGENCY MEDICINE

## 2018-06-07 PROCEDURE — 84100 ASSAY OF PHOSPHORUS: CPT | Performed by: INTERNAL MEDICINE

## 2018-06-07 RX ORDER — INSULIN LISPRO 100 [IU]/ML
INJECTION, SOLUTION INTRAVENOUS; SUBCUTANEOUS
Status: DISCONTINUED | OUTPATIENT
Start: 2018-06-08 | End: 2018-06-11 | Stop reason: HOSPADM

## 2018-06-07 RX ORDER — MAGNESIUM SULFATE HEPTAHYDRATE 40 MG/ML
2 INJECTION, SOLUTION INTRAVENOUS ONCE
Status: COMPLETED | OUTPATIENT
Start: 2018-06-08 | End: 2018-06-08

## 2018-06-07 RX ORDER — SODIUM CHLORIDE 9 MG/ML
75 INJECTION, SOLUTION INTRAVENOUS CONTINUOUS
Status: DISCONTINUED | OUTPATIENT
Start: 2018-06-07 | End: 2018-06-09

## 2018-06-07 RX ORDER — MAGNESIUM SULFATE 100 %
4 CRYSTALS MISCELLANEOUS AS NEEDED
Status: DISCONTINUED | OUTPATIENT
Start: 2018-06-07 | End: 2018-06-11 | Stop reason: HOSPADM

## 2018-06-07 RX ORDER — DEXTROSE 50 % IN WATER (D50W) INTRAVENOUS SYRINGE
25-50 AS NEEDED
Status: DISCONTINUED | OUTPATIENT
Start: 2018-06-07 | End: 2018-06-11 | Stop reason: HOSPADM

## 2018-06-07 RX ORDER — MAGNESIUM SULFATE HEPTAHYDRATE 40 MG/ML
2 INJECTION, SOLUTION INTRAVENOUS ONCE
Status: COMPLETED | OUTPATIENT
Start: 2018-06-07 | End: 2018-06-07

## 2018-06-07 RX ORDER — ACETAMINOPHEN 325 MG/1
650 TABLET ORAL
Status: DISCONTINUED | OUTPATIENT
Start: 2018-06-07 | End: 2018-06-11 | Stop reason: HOSPADM

## 2018-06-07 RX ORDER — ONDANSETRON 2 MG/ML
4 INJECTION INTRAMUSCULAR; INTRAVENOUS
Status: DISCONTINUED | OUTPATIENT
Start: 2018-06-07 | End: 2018-06-11 | Stop reason: HOSPADM

## 2018-06-07 RX ORDER — MAGNESIUM SULFATE HEPTAHYDRATE 40 MG/ML
2 INJECTION, SOLUTION INTRAVENOUS ONCE
Status: COMPLETED | OUTPATIENT
Start: 2018-06-07 | End: 2018-06-08

## 2018-06-07 RX ORDER — HEPARIN SODIUM 5000 [USP'U]/ML
5000 INJECTION, SOLUTION INTRAVENOUS; SUBCUTANEOUS EVERY 8 HOURS
Status: DISCONTINUED | OUTPATIENT
Start: 2018-06-07 | End: 2018-06-07

## 2018-06-07 RX ADMIN — SODIUM CHLORIDE 75 ML/HR: 900 INJECTION, SOLUTION INTRAVENOUS at 23:55

## 2018-06-07 RX ADMIN — MAGNESIUM SULFATE HEPTAHYDRATE 2 G: 40 INJECTION, SOLUTION INTRAVENOUS at 21:02

## 2018-06-07 RX ADMIN — SODIUM CHLORIDE 500 ML: 900 INJECTION, SOLUTION INTRAVENOUS at 20:59

## 2018-06-07 RX ADMIN — CLINDAMYCIN PHOSPHATE 300 MG: 150 INJECTION, SOLUTION INTRAVENOUS at 23:55

## 2018-06-07 NOTE — ED TRIAGE NOTES
Pt brought to ED by EMS c/c nausea, chest \"heaviness\", and generalized fatigue onset 1 hour ago. PT not actively vomiting, pt is alert and oriented X4, answers all questions appropriate, no drift noted, or facial droop.

## 2018-06-08 ENCOUNTER — APPOINTMENT (OUTPATIENT)
Dept: CT IMAGING | Age: 83
DRG: 863 | End: 2018-06-08
Attending: INTERNAL MEDICINE
Payer: MEDICARE

## 2018-06-08 LAB
ANION GAP SERPL CALC-SCNC: 13 MMOL/L (ref 3–18)
BASOPHILS # BLD: 0 K/UL (ref 0–0.06)
BASOPHILS NFR BLD: 0 % (ref 0–2)
BUN SERPL-MCNC: 81 MG/DL (ref 7–18)
BUN/CREAT SERPL: 44 (ref 12–20)
CALCIUM SERPL-MCNC: 7.2 MG/DL (ref 8.5–10.1)
CHLORIDE SERPL-SCNC: 106 MMOL/L (ref 100–108)
CK MB CFR SERPL CALC: 3.9 % (ref 0–4)
CK MB CFR SERPL CALC: 4.4 % (ref 0–4)
CK MB CFR SERPL CALC: 4.8 % (ref 0–4)
CK MB SERPL-MCNC: 1.4 NG/ML (ref 5–25)
CK MB SERPL-MCNC: 1.6 NG/ML (ref 5–25)
CK MB SERPL-MCNC: 1.7 NG/ML (ref 5–25)
CK SERPL-CCNC: 33 U/L (ref 26–192)
CK SERPL-CCNC: 36 U/L (ref 26–192)
CK SERPL-CCNC: 39 U/L (ref 26–192)
CO2 SERPL-SCNC: 22 MMOL/L (ref 21–32)
CREAT SERPL-MCNC: 1.84 MG/DL (ref 0.6–1.3)
DIFFERENTIAL METHOD BLD: ABNORMAL
EOSINOPHIL # BLD: 0 K/UL (ref 0–0.4)
EOSINOPHIL NFR BLD: 0 % (ref 0–5)
ERYTHROCYTE [DISTWIDTH] IN BLOOD BY AUTOMATED COUNT: 14 % (ref 11.6–14.5)
GLUCOSE BLD STRIP.AUTO-MCNC: 150 MG/DL (ref 70–110)
GLUCOSE BLD STRIP.AUTO-MCNC: 189 MG/DL (ref 70–110)
GLUCOSE BLD STRIP.AUTO-MCNC: 212 MG/DL (ref 70–110)
GLUCOSE BLD STRIP.AUTO-MCNC: 90 MG/DL (ref 70–110)
GLUCOSE SERPL-MCNC: 208 MG/DL (ref 74–99)
HCT VFR BLD AUTO: 32 % (ref 35–45)
HGB BLD-MCNC: 10.5 G/DL (ref 12–16)
LYMPHOCYTES # BLD: 1.1 K/UL (ref 0.9–3.6)
LYMPHOCYTES NFR BLD: 9 % (ref 21–52)
MAGNESIUM SERPL-MCNC: 3.1 MG/DL (ref 1.6–2.6)
MCH RBC QN AUTO: 28 PG (ref 24–34)
MCHC RBC AUTO-ENTMCNC: 32.8 G/DL (ref 31–37)
MCV RBC AUTO: 85.3 FL (ref 74–97)
MONOCYTES # BLD: 1.2 K/UL (ref 0.05–1.2)
MONOCYTES NFR BLD: 9 % (ref 3–10)
NEUTS SEG # BLD: 10.4 K/UL (ref 1.8–8)
NEUTS SEG NFR BLD: 82 % (ref 40–73)
PHOSPHATE SERPL-MCNC: 4 MG/DL (ref 2.5–4.9)
PHOSPHATE SERPL-MCNC: 4.1 MG/DL (ref 2.5–4.9)
PLATELET # BLD AUTO: 53 K/UL (ref 135–420)
PMV BLD AUTO: 10.3 FL (ref 9.2–11.8)
POTASSIUM SERPL-SCNC: 3.3 MMOL/L (ref 3.5–5.5)
RBC # BLD AUTO: 3.75 M/UL (ref 4.2–5.3)
SODIUM SERPL-SCNC: 141 MMOL/L (ref 136–145)
TROPONIN I SERPL-MCNC: 0.05 NG/ML (ref 0–0.06)
TSH SERPL DL<=0.05 MIU/L-ACNC: 0.6 UIU/ML (ref 0.36–3.74)
WBC # BLD AUTO: 12.6 K/UL (ref 4.6–13.2)

## 2018-06-08 PROCEDURE — 74011250637 HC RX REV CODE- 250/637: Performed by: INTERNAL MEDICINE

## 2018-06-08 PROCEDURE — 80048 BASIC METABOLIC PNL TOTAL CA: CPT | Performed by: INTERNAL MEDICINE

## 2018-06-08 PROCEDURE — 84100 ASSAY OF PHOSPHORUS: CPT | Performed by: INTERNAL MEDICINE

## 2018-06-08 PROCEDURE — 84443 ASSAY THYROID STIM HORMONE: CPT | Performed by: INTERNAL MEDICINE

## 2018-06-08 PROCEDURE — 83735 ASSAY OF MAGNESIUM: CPT | Performed by: INTERNAL MEDICINE

## 2018-06-08 PROCEDURE — 74011250636 HC RX REV CODE- 250/636: Performed by: INTERNAL MEDICINE

## 2018-06-08 PROCEDURE — 85025 COMPLETE CBC W/AUTO DIFF WBC: CPT | Performed by: INTERNAL MEDICINE

## 2018-06-08 PROCEDURE — 82550 ASSAY OF CK (CPK): CPT | Performed by: INTERNAL MEDICINE

## 2018-06-08 PROCEDURE — 74011000258 HC RX REV CODE- 258: Performed by: INTERNAL MEDICINE

## 2018-06-08 PROCEDURE — 74011636637 HC RX REV CODE- 636/637: Performed by: INTERNAL MEDICINE

## 2018-06-08 PROCEDURE — 82962 GLUCOSE BLOOD TEST: CPT

## 2018-06-08 PROCEDURE — 36415 COLL VENOUS BLD VENIPUNCTURE: CPT | Performed by: INTERNAL MEDICINE

## 2018-06-08 PROCEDURE — 74011000250 HC RX REV CODE- 250: Performed by: PHYSICIAN ASSISTANT

## 2018-06-08 PROCEDURE — 65660000000 HC RM CCU STEPDOWN

## 2018-06-08 PROCEDURE — 74011250637 HC RX REV CODE- 250/637: Performed by: PHYSICIAN ASSISTANT

## 2018-06-08 PROCEDURE — 74011000250 HC RX REV CODE- 250: Performed by: INTERNAL MEDICINE

## 2018-06-08 PROCEDURE — 70450 CT HEAD/BRAIN W/O DYE: CPT

## 2018-06-08 RX ORDER — POTASSIUM CHLORIDE 1.5 G/1.77G
40 POWDER, FOR SOLUTION ORAL
Status: COMPLETED | OUTPATIENT
Start: 2018-06-08 | End: 2018-06-08

## 2018-06-08 RX ORDER — ALBUTEROL SULFATE 90 UG/1
1 AEROSOL, METERED RESPIRATORY (INHALATION)
Status: DISCONTINUED | OUTPATIENT
Start: 2018-06-08 | End: 2018-06-11 | Stop reason: HOSPADM

## 2018-06-08 RX ORDER — SEVELAMER CARBONATE 800 MG/1
800 TABLET, FILM COATED ORAL
Status: DISCONTINUED | OUTPATIENT
Start: 2018-06-08 | End: 2018-06-11 | Stop reason: HOSPADM

## 2018-06-08 RX ORDER — CILOSTAZOL 50 MG/1
50 TABLET ORAL
Status: DISCONTINUED | OUTPATIENT
Start: 2018-06-08 | End: 2018-06-11 | Stop reason: HOSPADM

## 2018-06-08 RX ORDER — GUAIFENESIN 100 MG/5ML
81 LIQUID (ML) ORAL EVERY OTHER DAY
Status: DISCONTINUED | OUTPATIENT
Start: 2018-06-09 | End: 2018-06-09

## 2018-06-08 RX ORDER — SOLIFENACIN SUCCINATE 5 MG/1
5 TABLET, FILM COATED ORAL DAILY
Status: DISCONTINUED | OUTPATIENT
Start: 2018-06-08 | End: 2018-06-11 | Stop reason: HOSPADM

## 2018-06-08 RX ORDER — CARVEDILOL 3.12 MG/1
6.25 TABLET ORAL 2 TIMES DAILY WITH MEALS
Status: DISCONTINUED | OUTPATIENT
Start: 2018-06-08 | End: 2018-06-11 | Stop reason: HOSPADM

## 2018-06-08 RX ORDER — CYCLOSPORINE 0.5 MG/ML
1 EMULSION OPHTHALMIC 2 TIMES DAILY
Status: DISCONTINUED | OUTPATIENT
Start: 2018-06-08 | End: 2018-06-11 | Stop reason: HOSPADM

## 2018-06-08 RX ORDER — CARBAMAZEPINE 200 MG/1
300 TABLET ORAL 2 TIMES DAILY
Status: DISCONTINUED | OUTPATIENT
Start: 2018-06-08 | End: 2018-06-11 | Stop reason: HOSPADM

## 2018-06-08 RX ADMIN — CILOSTAZOL 50 MG: 50 TABLET ORAL at 17:12

## 2018-06-08 RX ADMIN — CLINDAMYCIN PHOSPHATE 300 MG: 150 INJECTION, SOLUTION INTRAVENOUS at 22:10

## 2018-06-08 RX ADMIN — ACETAMINOPHEN 650 MG: 325 TABLET ORAL at 09:22

## 2018-06-08 RX ADMIN — UMECLIDINIUM BROMIDE AND VILANTEROL TRIFENATATE 1 PUFF: 62.5; 25 POWDER RESPIRATORY (INHALATION) at 11:14

## 2018-06-08 RX ADMIN — CLINDAMYCIN PHOSPHATE 300 MG: 150 INJECTION, SOLUTION INTRAVENOUS at 14:10

## 2018-06-08 RX ADMIN — CARBAMAZEPINE 300 MG: 200 TABLET ORAL at 11:13

## 2018-06-08 RX ADMIN — CARVEDILOL 6.25 MG: 3.12 TABLET, FILM COATED ORAL at 11:13

## 2018-06-08 RX ADMIN — INSULIN LISPRO 2 UNITS: 100 INJECTION, SOLUTION INTRAVENOUS; SUBCUTANEOUS at 22:15

## 2018-06-08 RX ADMIN — MAGNESIUM SULFATE HEPTAHYDRATE 2 G: 40 INJECTION, SOLUTION INTRAVENOUS at 01:23

## 2018-06-08 RX ADMIN — CYCLOSPORINE 1 DROP: 0.5 EMULSION OPHTHALMIC at 22:09

## 2018-06-08 RX ADMIN — CLINDAMYCIN PHOSPHATE 300 MG: 150 INJECTION, SOLUTION INTRAVENOUS at 07:01

## 2018-06-08 RX ADMIN — SEVELAMER CARBONATE 800 MG: 800 TABLET, FILM COATED ORAL at 11:14

## 2018-06-08 RX ADMIN — SOLIFENACIN SUCCINATE 5 MG: 5 TABLET, FILM COATED ORAL at 11:14

## 2018-06-08 RX ADMIN — CYCLOSPORINE 1 DROP: 0.5 EMULSION OPHTHALMIC at 11:00

## 2018-06-08 RX ADMIN — INSULIN LISPRO 4 UNITS: 100 INJECTION, SOLUTION INTRAVENOUS; SUBCUTANEOUS at 12:23

## 2018-06-08 RX ADMIN — MAGNESIUM SULFATE HEPTAHYDRATE 2 G: 40 INJECTION, SOLUTION INTRAVENOUS at 00:00

## 2018-06-08 RX ADMIN — CARBAMAZEPINE 300 MG: 200 TABLET ORAL at 22:03

## 2018-06-08 RX ADMIN — CARVEDILOL 6.25 MG: 3.12 TABLET, FILM COATED ORAL at 17:12

## 2018-06-08 RX ADMIN — POTASSIUM CHLORIDE 40 MEQ: 1.5 POWDER, FOR SOLUTION ORAL at 09:23

## 2018-06-08 RX ADMIN — INSULIN LISPRO 2 UNITS: 100 INJECTION, SOLUTION INTRAVENOUS; SUBCUTANEOUS at 07:02

## 2018-06-08 NOTE — PROGRESS NOTES
Chart reviewed. Pt admitted for JONES. Provider, please consider ordering palliative care/spiritual care consult to address advance care plan. CM will follow for discharge planning needs. 1120  Attended IDRs with LINCOLN DESOUZA. Pts discharge dispo: TBD. Pt states she lives alone. Pt states she has an adult daughter, Monica Huertas who lives locally. Pt states she currently uses [facility] [employer] for transport, however, she states they are no longer running. Pt states she is unable to take an [employer], as she does not have a smart phone. Pt states she can take taxi or bus. Pt is awaiting PT eval and notes for recommendations. Pt states she is uncertain about going to rehab or using [facility], as [facility] (per patient). Pt states she has a RW for use at home. CM will cont to follow. Reason for Admission:   Per chart, pt is a 82 y.o. female with past medical history significant for CHF, CKD, HTN, CAD, PAD DM2 w recent excision of skin cancer on her leg presents to the ER with acute onset of generalized weakness. She had reported to EMS she had chest heaviness but denies to me currently. She reports she felt she \" needed to put \" her head down on the coutner and had a difficutly getting it up. She denies LOC, dizzienss, n,v,diarrhea. She does report that her LE has been getting more erythematous but denies fever or chills.     On presentation to the ER she was afeberile, normotenstive with out hypoxia. She had nonfocal neurologic examination, clear lungs, dry mucus membranes. Her LLE had an incision which was sutured w erythema extending from incision site. Labs reveal leukocytosis, elevated Cr and Mg 0.9. EKG nonfocal, CXR clear. Medicine is asked to admit for further management.                   RRAT Score:     26, HIGH             Resources/supports as identified by patient/family:                   Top Challenges facing patient (as identified by patient/family and CM): Finances/Medication cost?                    Transportation? Support system or lack thereof? Living arrangements? Self-care/ADLs/Cognition? Current Advanced Directive/Advance Care Plan:   Not on file                          Plan for utilizing home health:    HH vs SNF                      Likelihood of readmission:  high                 Transition of Care Plan:          TBD          Care Management Interventions  PCP Verified by CM: Yes  Transition of Care Consult (CM Consult): Discharge Planning  Physical Therapy Consult: Yes  Occupational Therapy Consult: Yes  Current Support Network: Own Home  Confirm Follow Up Transport: Cab  Plan discussed with Pt/Family/Caregiver: Yes  Freedom of Choice Offered:  Yes

## 2018-06-08 NOTE — ED NOTES
Attempted to assist pt to bathroom. Upon standing, pt became very dizzy and unable to walk. Pt sat down for a moment and recovered. Attempted to assist pt to bathroom in wc with pt becoming disoriented, lethargic, and unable to move her legs. Returned pt to bed with pt immediately becoming oriented x4, and able to purposefully move all extremities. Dr. Ludin Rivers made aware with physician returning to the bedside.

## 2018-06-08 NOTE — PROGRESS NOTES
TRANSFER - IN REPORT:    Verbal report received from [address] [place] [address] Carmen RAO on Jett Laughlin  being received from Emergency Dept. for routine progression of care      Report consisted of patients Situation, Background, Assessment and   Recommendations(SBAR). Information from the following report(s) SBAR, Kardex, ED Summary, Procedure Summary, Intake/Output, MAR and Recent Results was reviewed with the receiving nurse. Opportunity for questions and clarification was provided. Assessment completed upon patients arrival to unit and care assumed. 2300: Assumed patient care, she was alert and oriented to person, place, time and situation. Respiratory status was stable on room air. Vital signs were stable. MEWS score was a one. Patient denied any nausea vomiting dizziness or anxiety. White board was updated and explained. Bed was locked and in lowest position. Call bell, water and personal belongings were within reach. Patient had no questions, comments or concerns after bedside shift report. 0300: Patient was taken to CT via bed for a CT of her head without contrast. She was transported with a monitor, a Telemetry RN, and a nurses' aid.     0315: Patient appeared to tolerate the CT well. She was watching tv and resting quietly in her room with no signs of distress noted. 0400: Patient had several episodes of bradycardia, so this nurse called the Hospitalist, Roberto Carlos Caballero, to see if he wanted to order a Cardiology consult. Roberto Carlos Caballero ordered a TSH to be drawn and stated that the decision about the Cardiology consult would be handled during interdisciplinary rounds. Order was read back and verified. 0700: Patient's respiratory status, vital signs and MEWS score remained stable. Patient was resting quietly with no signs of distress noted. Bed locked and in lowest position. Call bell water and personal belongings were within reach.  Patient had no questions, comments or concerns after bedside shift report.  Bedside report given to Jaymie Valencia R.N.

## 2018-06-08 NOTE — PROGRESS NOTES
Hospitalist Progress Note    Patient: Keith Yung MRN: [id number]  CSN: [id number]    YOB: 1935  Age: 82 y.o. Sex: female    DOA: 6/7/2018 LOS:  LOS: 1 day          Chief Complaint:    Leg Redness    Assessment/Plan     1. JONES  2. Hypomagnesemia  3. Surgical Wound Infection/Cellulitis  4. Chronic Systolic Heart Failure  5. DM2  6. CAD  7. Thrombocytopenia    1. Continue with gentle hydration, Cr has improved overnight. Will continue to monitor with daily BMP. 2. Resolved. 3. Continue with IV clindamycin. Monitor WBC count watch for signs of systemic illness. 4. Meds have been reconciled by patient's RN and have been ordered. Continue to monitor fluid status. 5. Continue with SSI, monitor needs and adjust accordingly. 6. Continue asa and Pletal. Trish are flat. 7. Platelets 53 this AM.     Dispo: PT/OT, pending response to treatment 1-2 days    Patient Active Problem List   Diagnosis Code    Multi-vessel coronary artery disease I25.10    Peripheral vascular disease and claudication I73.9    Carotid artery disease ([facility] I77.9    Hypertension I10    DM2 (diabetes mellitus, type 2) ([facility]) E11.9    Bradycardia R00.1    Seizure disorder ([facility] [address] G40.909    CKD (chronic kidney disease) N18.9    H/O orthostatic hypotension Z86.79    Ischemic cardiomyopathy I25.5    Chronic systolic CHF (congestive heart failure) ([facility]) I50.22    High cholesterol E78.00    Chronic airway obstruction, not elsewhere classified J44.9    Hypomagnesemia E83.42    Ambulatory dysfunction R26.2    JONES (acute kidney injury) ([facility]) N17.9    Cellulitis L03.90       Subjective:    Feels well overall this AM.   No complaints regarding her leg.      Review of systems:    Constitutional: denies fevers, chills, myalgias  Respiratory: denies SOB, cough  Cardiovascular: denies chest pain, palpitations  Gastrointestinal: denies nausea, vomiting, diarrhea      Vital signs/Intake and Output:  Visit Vitals    BP 168/61 (BP 1 Location: Left arm, BP Patient Position: At rest;Supine; Head of bed elevated (Comment degrees))    Pulse 75    Temp 98 °F (36.7 °C)    Resp 16    Ht 5' 5\" (1.651 m)    Wt 64.7 kg (142 lb 11.2 oz)    SpO2 100%    BMI 23.75 kg/m2     Current Shift:  06/08 0701 - 06/08 1900  In: -   Out: 150   Last three shifts:  06/06 1901 - 06/08 0700  In: -   Out: 150     Exam:    General: Elderly female, alert, NAD, OX3  Head/Neck: NCAT, supple, No masses, No lymphadenopathy  CVS:Regular rate and rhythm, no M/R/G, S1/S2 heard, no thrill  Lungs:Clear to auscultation bilaterally, no wheezes, rhonchi, or rales  Abdomen: Soft, Nontender, No distention, Normal Bowel sounds, No hepatomegaly  Extremities: No C/C/E, pulses palpable 1+. Left lower extremity with sutured linear incision with surrounding erythema. Skin:normal texture and turgor, no rashes, no lesions  Neuro:grossly normal , follows commands  Psych:appropriate                Labs: Results:       Chemistry Recent Labs      06/08/18 0433 06/07/18 2000   GLU  208*  295*   NA  141  138   K  3.3*  3.7   CL  106  100   CO2  22  24   BUN  81*  86*   CREA  1.84*  2.12*   CA  7.2*  7.3*   AGAP  13  14   BUCR  44*  41*   AP   --   70   TP   --   7.2   ALB   --   3.1*   GLOB   --   4.1*   AGRAT   --   0.8      CBC w/Diff Recent Labs      06/08/18 0433 06/07/18 2000   WBC  12.6  14.5*   RBC  3.75*  3.96*   HGB  10.5*  11.3*   HCT  32.0*  33.8*   PLT  53*  51*   GRANS  82*  82*   LYMPH  9*  10*   EOS  0  0      Cardiac Enzymes Recent Labs      06/08/18   1125  06/08/18 0433   CPK  36  33   CKND1  3.9  4.8*      Coagulation No results for input(s): PTP, INR, APTT in the last 72 hours. No lab exists for component: INREXT    Lipid Panel No results found for: CHOL, CHOLPOCT, CHOLX, CHLST, CHOLV, 427908, HDL, LDL, LDLC, DLDLP, 042223, VLDLC, VLDL, TGLX, TRIGL, TRIGP, TGLPOCT, CHHD, CHHDX   BNP No results for input(s): BNPP in the last 72 hours.    Liver Enzymes Recent Labs      06/07/18 2000   TP  7.2   ALB  3.1*   AP  70   SGOT  26      Thyroid Studies Lab Results   Component Value Date/Time    TSH 0.60 06/08/2018 04:33 AM        Procedures/imaging: see electronic medical records for all procedures/Xrays and details which were not copied into this note but were reviewed prior to creation of [address] Deepika Win, PA-C

## 2018-06-08 NOTE — H&P
History & Physical    Patient: Justina Garcia MRN: [id number]  CSN: [id number]    YOB: 1935  Age: 82 y.o. Sex: female      DOA: 6/7/2018  Primary Care Provider:  Brandan Kelley MD      Assessment/Plan     1. JONES  2. hypomagnasemia  3. Surgical wound infection   4. cellultis  5. Chronic systolic heart failure, compensated & on dry side currently  6. DM2  7. CAD  8. Thrombocytopenia    PLAN:  - Admit to medical service with telemetry monitoring  - gentle hydration  - replete magnesium  - obtain UA C&S, culture blood  - clindamycin for cellulitis  - monitor glucose and utilize correction as needed  - unsure of medications, will need to reconcile in AM  - obtain cardiac enzymes  - full code, dvt ppx hold due to thrombocytopenia and surgical wound on LE    Patient Active Problem List   Diagnosis Code    Multi-vessel coronary artery disease I25.10    Peripheral vascular disease and claudication I73.9    Carotid artery disease ([facility] [address] I77.9    Hypertension I10    DM2 (diabetes mellitus, type 2) ([facility]) E11.9    Bradycardia R00.1    Seizure disorder ([facility] [address] G40.909    CKD (chronic kidney disease) N18.9    H/O orthostatic hypotension Z86.79    Ischemic cardiomyopathy I25.5    Chronic systolic CHF (congestive heart failure) ([facility]) I50.22    High cholesterol E78.00    Chronic airway obstruction, not elsewhere classified J44.9    Hypomagnesemia E83.42    Ambulatory dysfunction R26.2    JONES (acute kidney injury) ([facility] [address] N17.9    Cellulitis L03.90     HPI:   CC:\" I got really weak\"  Justina Garcia is a 82 y.o. female with past medical history significant for CHF, CKD, HTN, CAD, PAD DM2 w recent excision of skin cancer on her leg presents to the ER with acute onset of generalized weakness. She had reported to EMS she had chest heaviness but denies to me currently. She reports she felt she \" needed to put \" her head down on the coutner and had a difficutly getting it up.  She denies LOC, dizzienss, n,v,diarrhea. She does report that her LE has been getting more erythematous but denies fever or chills. On presentation to the ER she was afeberile, normotenstive with out hypoxia. She had nonfocal neurologic examination, clear lungs, dry mucus membranes. Her LLE had an incision which was sutured w erythema extending from incision site. Labs reveal leukocytosis, elevated Cr and Mg 0.9. EKG nonfocal, CXR clear. Medicine is asked to admit for further management. d    Past Medical History:   Diagnosis Date    Adult-onset diabetes     Cancer ([facility] [address] 2016    Mohs procedures to right nose and under right eye    Carotid disease, bilateral (HCC)     Chronic kidney disease     renal insufficiency    Chronic obstructive pulmonary disease (COPD) ([facility] [address]     Diabetic neuropathy (HCC)     Emphysema     Heart murmur     Hypercholesterolemia     Hypertension     Multi-vessel coronary artery disease     Myocardial infarction ([facility] [address]     Pelvic fracture ([facility] [address] 01/2015    Peripheral vascular disease and claudication     [facility] disorder ([facility]     [facility]     Stroke [facility])      Past Surgical History:   Procedure Laterality Date    CABG, ARTERY-VEIN, THREE  circa 2008    HX CAROTID ENDARTERECTOMY      HX COLECTOMY      HX CORONARY ARTERY BYPASS GRAFT  2009    HX HEART CATHETERIZATION        Social History   Substance Use Topics    Smoking status: Former Smoker     Types: Cigarettes     Quit date: 1/27/1986    Smokeless tobacco: Never Used    Alcohol use No     Family History   Problem Relation Age of Onset    Hypertension Mother      No current facility-administered medications on file prior to encounter. Current Outpatient Prescriptions on File Prior to Encounter   Medication Sig Dispense Refill    CARBAMAZEPINE (TEGRETOL PO) Take 300 mg by mouth two (2) times a day.  Cholecalciferol, Vitamin D3, 50,000 unit cap Take 50,000 Units by mouth every seven (7) days.       insulin lispro (HUMALOG KWIKPEN) 100 unit/mL kwikpen 8 Units by SubCUTAneous route Before breakfast and dinner.  tiotropium-olodaterol (STIOLTO RESPIMAT) 2.5-2.5 mcg/actuation mist Take  by inhalation.  fludrocortisone (FLORINEF) 0.1 mg tablet TAKE 1 TABLET DAILY 90 Tab 1    carvedilol (COREG) 25 mg tablet TAKE 1 TABLET TWICE A DAY WITH MEALS (Patient taking differently: TAKE 1 TABLET TWICE A DAY WITH MEALS; pt is taking 6.25 mg BID) 180 Tab 1    pitavastatin (LIVALO) 1 mg tab tablet Take 1 tablet by mouth daily. 90 tablet 3    solifenacin (VESICARE) 5 mg tablet Take 5 mg by mouth daily.  albuterol (PROAIR HFA) 90 mcg/actuation inhaler Take  by inhalation.  cilostazol (PLETAL) 50 mg tablet Take 50 mg by mouth Before breakfast and dinner.  insulin glargine (LANTUS) 100 unit/mL injection 20 Units by SubCUTAneous route two (2) times a day.  sodium bicarbonate 650 mg tablet Take 2 Tabs by mouth two (2) times a day. 360 Tab 3    aspirin 81 mg tablet Take 81 mg by mouth every other day.  Sevelamer Carbonate (RENVELA) 800 mg Tab Take 800 mg by mouth daily (with lunch).  CYCLOSPORINE (RESTASIS OP) Apply 1 Drop to eye two (2) times a day.         Allergies   Allergen Reactions    Clonidine Anaphylaxis    Statins-Hmg-Coa Reductase Inhibitors Myalgia    Sulfa (Sulfonamide Antibiotics) Other (comments)     Doesn't remember         Review of Systems  Constitutional: No fever, chills, diaphoresis, malaise,+ fatigue - weight gain/loss or falls  Skin: no itching or rashes  HEENT: no neck stiffness, hearing loss, tinnitus, epistaxis or sore throat  EYES: no blurry vision, double vision - photophobia  CARDIOVASCULAR: no, cp, palpitations, orthopnea, pnd or LE edema  PULMONARY: no cough, wheeze, shortness of breath or sputum production  GI: no nausea, vomiting, diarrhea, abdominal pain, melena, hematemesis or brbpr  : no dysuria, hematuria  MUSCULOSKELETAL: no back pain, joint pain or myalgias  ENDOCRINE: no heat/cold intolerance, polyuria or polydipsia  HEME: no easy bruising or bleeding  NEURO: no unilateral weakness, numbness, tingling or seizures      Physical Exam:        Visit Vitals    /70 (BP 1 Location: Left arm, BP Patient Position: At rest)    Pulse 65    Temp 97.8 °F (36.6 °C)    Resp 18    Ht 5' 5\" (1.651 m)    Wt 65.8 kg (145 lb)    SpO2 100%    BMI 24.13 kg/m2      O2 Device: Room air    Temp (24hrs), Av.7 °F (36.5 °C), Min:97.5 °F (36.4 °C), Max:97.8 °F (36.6 °C)           Body mass index is 24.13 kg/(m^2). General:  Awake, cooperative, no distress. Head:  Normocephalic, without obvious abnormality, atraumatic. Eyes:  Conjunctivae/corneas clear, sclera anicteric, PERRL, EOMs intact. Nose: Nares normal. No drainage or sinus tenderness. Throat: Lips, mucosa, and tongue normal. .   Neck: Supple, symmetrical, trachea midline, no adenopathy. Lungs:   Clear to auscultation bilaterally, equal expansion       Heart:  Regular rate and rhythm, S1, S2 normal, no murmur, click, rub or gallop, cap refill normal      Abdomen: Soft, non-tender. Bowel sounds normal. No masses,  No organomegaly. Extremities: Extremities normal, atraumatic, no cyanosis or edema, incision RLE sutured w erythema surroudning   Pulses: 1+ and symmetric all extremities. Skin: Skin color pale, texture, turgor normal. No rashes or lesions   Neurologic: CNII-XII intact. No focal motor or sensory deficit.            Laboratory Studies:    CMP:   Lab Results   Component Value Date/Time     2018 08:00 PM    K 3.7 2018 08:00 PM     2018 08:00 PM    CO2 24 2018 08:00 PM    AGAP 14 2018 08:00 PM     (H) 2018 08:00 PM    BUN 86 (H) 2018 08:00 PM    CREA 2.12 (H) 2018 08:00 PM    GFRAA 27 (L) 2018 08:00 PM    GFRNA 22 (L) 2018 08:00 PM    CA 7.3 (L) 2018 08:00 PM    MG 0.9 (LL) 2018 08:00 PM    ALB 3.1 (L) 06/07/2018 08:00 PM    TP 7.2 06/07/2018 08:00 PM    GLOB 4.1 (H) 06/07/2018 08:00 PM    AGRAT 0.8 06/07/2018 08:00 PM    SGOT 26 06/07/2018 08:00 PM    ALT 35 06/07/2018 08:00 PM     CBC:   Lab Results   Component Value Date/Time    WBC 14.5 (H) 06/07/2018 08:00 PM    HGB 11.3 (L) 06/07/2018 08:00 PM    HCT 33.8 (L) 06/07/2018 08:00 PM    PLT 51 (L) 06/07/2018 08:00 PM       Imaging studies personally reviewed:  EKG: nothing acute  Reviewed old records including old H&P, consultation notes and DC summaries        Devonte Garcia DO  Internal Medicine/Geriatrics

## 2018-06-08 NOTE — ED PROVIDER NOTES
[address]  EMERGENCY DEPARTMENT HISTORY AND PHYSICAL EXAM    Date: 6/7/2018  Patient Name: Rony Elkins  YOB: 1935  Medical Record Number: [id number]     History of Presenting Illness     Chief Complaint   Patient presents with    Nausea    Fatigue    Chest Pain       History Provided By: Patient and Patient's Daughter    Chief Complaint: Fatigue  Duration: 5 Days  Timing:  Gradual and Worsening  Location: Generalized body  Quality: Tired, weak  Severity: Mild  Modifying Factors: No relieving or worsening factors  Associated Symptoms: Generalized weakness and nausea onset this evening, and chest heaviness en route via EMS    Additional History (Context):   8:03 PM  Rony Elkins is a 82 y.o. female with PMHX CAD, peripheral vascular dz, HTN, seizure, DM, stroke, CKD, HLD, COPD, & CA, who presents to the emergency department via EMS C/O gradually worsening fatigue x 5 days. Associated symptoms include generalized weakness, nausea, and chest heaviness en route via EMS. No previous chest heaviness before tonight. Reports the nausea began after dinner this evening and when pt noticed her weakness progress this evening, she had slid down to the floor to avoid falling. Also endorses HA, but states this is not new for her. States sxs do not feel similar to when she had previous cardiac issues in the past. Per daughter, pt is currently low on iron and has scheduled infusion. PSHx CABG 10 years ago. Pt denies any new or other pain, CP, vomiting, head injury, and any other Sx or complaints. Shx: former tobacco use, -EtOH use, -illicit drug use    PCP: Farheen Rosario MD  Specialist: Janis Rahman MD (Cardiology) who follows her regularly, Nephrologist    Current Outpatient Prescriptions   Medication Sig Dispense Refill    CARBAMAZEPINE (TEGRETOL PO) Take 300 mg by mouth two (2) times a day.       Cholecalciferol, Vitamin D3, 50,000 unit cap Take 50,000 Units by mouth every seven (7) days.  insulin lispro (HUMALOG KWIKPEN) 100 unit/mL kwikpen 8 Units by SubCUTAneous route Before breakfast and dinner.  tiotropium-olodaterol (STIOLTO RESPIMAT) 2.5-2.5 mcg/actuation mist Take  by inhalation.  fludrocortisone (FLORINEF) 0.1 mg tablet TAKE 1 TABLET DAILY 90 Tab 1    carvedilol (COREG) 25 mg tablet TAKE 1 TABLET TWICE A DAY WITH MEALS (Patient taking differently: TAKE 1 TABLET TWICE A DAY WITH MEALS; pt is taking 6.25 mg BID) 180 Tab 1    pitavastatin (LIVALO) 1 mg tab tablet Take 1 tablet by mouth daily. 90 tablet 3    solifenacin (VESICARE) 5 mg tablet Take 5 mg by mouth daily.  albuterol (PROAIR HFA) 90 mcg/actuation inhaler Take  by inhalation.  cilostazol (PLETAL) 50 mg tablet Take 50 mg by mouth Before breakfast and dinner.  insulin glargine (LANTUS) 100 unit/mL injection 20 Units by SubCUTAneous route two (2) times a day.  sodium bicarbonate 650 mg tablet Take 2 Tabs by mouth two (2) times a day. 360 Tab 3    aspirin 81 mg tablet Take 81 mg by mouth every other day.  Sevelamer Carbonate (RENVELA) 800 mg Tab Take 800 mg by mouth daily (with lunch).  CYCLOSPORINE (RESTASIS OP) Apply 1 Drop to eye two (2) times a day.          Past History     Past Medical History:  Past Medical History:   Diagnosis Date    Adult-onset diabetes     Cancer ([address] 2016    Mohs procedures to right nose and under right eye    Carotid disease, bilateral ([address]     Chronic kidney disease     renal insufficiency    Chronic obstructive pulmonary disease (COPD) ([address]     Diabetic neuropathy (HCC)     Emphysema     Heart murmur     Hypercholesterolemia     Hypertension     Multi-vessel coronary artery disease     Myocardial infarction ([address]     Pelvic fracture ([address] 01/2015    Peripheral vascular disease and claudication     Seizure disorder (HCC)     Seizures ([address]     Stroke [facility])        Past Surgical History:  Past Surgical History: Procedure Laterality Date    CABG, ARTERY-VEIN, THREE  circa 2008    HX CAROTID ENDARTERECTOMY      HX COLECTOMY      HX CORONARY ARTERY BYPASS GRAFT  2009    HX HEART CATHETERIZATION         Family History:  Family History   Problem Relation Age of Onset    Hypertension Mother        Social History:  Social History   Substance Use Topics    Smoking status: Former Smoker     Types: Cigarettes     Quit date: 1/27/1986    Smokeless tobacco: Never Used    Alcohol use No       Allergies: Allergies   Allergen Reactions    Clonidine Anaphylaxis    Statins-Hmg-Coa Reductase Inhibitors Myalgia    Sulfa (Sulfonamide Antibiotics) Other (comments)     Doesn't remember           Review of Systems     Review of Systems   Cardiovascular: Negative for chest pain. (+) chest heaviness   Gastrointestinal: Positive for nausea. Negative for abdominal pain and vomiting. Musculoskeletal: Negative for arthralgias, back pain, myalgias and neck pain. Neurological: Positive for weakness (generalized) and headaches. (-) head injury   All other systems reviewed and are negative. Physical Exam     Vitals:    06/07/18 1942   BP: 144/64   Pulse: 70   Resp: 16   Temp: 97.5 °F (36.4 °C)   SpO2: 100%   Weight: 65.8 kg (145 lb)   Height: 5' 5\" (1.651 m)     Physical Exam   Constitutional: She is oriented to person, place, and time. She appears well-developed. A&Ox4   HENT:   Head: Normocephalic and atraumatic. Eyes: Pupils are equal, round, and reactive to light. Neck: Normal range of motion. Cardiovascular: Normal rate and regular rhythm. Exam reveals no gallop and no friction rub. No murmur heard. Pulmonary/Chest: Effort normal and breath sounds normal. No stridor. CTAB   Abdominal: Soft. She exhibits no distension. There is no tenderness. Musculoskeletal:        Legs:  Neurological: She is alert and oriented to person, place, and time.    Neurologic exam is completely normal   Psychiatric: She has a normal mood and affect. Nursing note and vitals reviewed. Diagnostic Study Results     Labs -     Recent Results (from the past 12 hour(s))   EKG, 12 LEAD, INITIAL    Collection Time: 06/07/18  7:54 PM   Result Value Ref Range    Ventricular Rate 74 BPM    Atrial Rate 74 BPM    P-R Interval 190 ms    QRS Duration 96 ms    Q-T Interval 412 ms    QTC Calculation (Bezet) 457 ms    Calculated P Axis 78 degrees    Calculated R Axis 40 degrees    Calculated T Axis -123 degrees    Diagnosis       Normal sinus rhythm  Inferior infarct , age undetermined  Cannot rule out Anterior infarct (cited on or before 07-JUN-2018)  ST & T wave abnormality, consider lateral ischemia  Abnormal ECG  When compared with ECG of 15-JUL-2012 20:40,  AZ interval has decreased  QRS duration has decreased  ST elevation now present in Inferior leads  T wave inversion now evident in Lateral leads     CBC WITH AUTOMATED DIFF    Collection Time: 06/07/18  8:00 PM   Result Value Ref Range    WBC 14.5 (H) 4.6 - 13.2 K/uL    RBC 3.96 (L) 4.20 - 5.30 M/uL    HGB 11.3 (L) 12.0 - 16.0 g/dL    HCT 33.8 (L) 35.0 - 45.0 %    MCV 85.4 74.0 - 97.0 FL    MCH 28.5 24.0 - 34.0 PG    MCHC 33.4 31.0 - 37.0 g/dL    RDW 14.0 11.6 - 14.5 %    PLATELET 51 (L) 247 - 420 K/uL    MPV 10.8 9.2 - 11.8 FL    NEUTROPHILS 82 (H) 40 - 73 %    LYMPHOCYTES 10 (L) 21 - 52 %    MONOCYTES 8 3 - 10 %    EOSINOPHILS 0 0 - 5 %    BASOPHILS 0 0 - 2 %    ABS. NEUTROPHILS 12.0 (H) 1.8 - 8.0 K/UL    ABS. LYMPHOCYTES 1.4 0.9 - 3.6 K/UL    ABS. MONOCYTES 1.1 0.05 - 1.2 K/UL    ABS. EOSINOPHILS 0.0 0.0 - 0.4 K/UL    ABS.  BASOPHILS 0.0 0.0 - 0.06 K/UL    DF AUTOMATED     METABOLIC PANEL, COMPREHENSIVE    Collection Time: 06/07/18  8:00 PM   Result Value Ref Range    Sodium 138 136 - 145 mmol/L    Potassium 3.7 3.5 - 5.5 mmol/L    Chloride 100 100 - 108 mmol/L    CO2 24 21 - 32 mmol/L    Anion gap 14 3.0 - 18 mmol/L    Glucose 295 (H) 74 - 99 mg/dL    BUN 86 (H) 7.0 - 18 MG/DL Creatinine 2.12 (H) 0.6 - 1.3 MG/DL    BUN/Creatinine ratio 41 (H) 12 - 20      GFR est AA 27 (L) >60 ml/min/1.73m2    GFR est non-AA 22 (L) >60 ml/min/1.73m2    Calcium 7.3 (L) 8.5 - 10.1 MG/DL    Bilirubin, total 0.3 0.2 - 1.0 MG/DL    ALT (SGPT) 35 13 - 56 U/L    AST (SGOT) 26 15 - 37 U/L    Alk. phosphatase 70 45 - 117 U/L    Protein, total 7.2 6.4 - 8.2 g/dL    Albumin 3.1 (L) 3.4 - 5.0 g/dL    Globulin 4.1 (H) 2.0 - 4.0 g/dL    A-G Ratio 0.8 0.8 - 1.7     LIPASE    Collection Time: 06/07/18  8:00 PM   Result Value Ref Range    Lipase 177 73 - 393 U/L   NT-PRO BNP    Collection Time: 06/07/18  8:00 PM   Result Value Ref Range    NT pro- 0 - 1800 PG/ML   MAGNESIUM    Collection Time: 06/07/18  8:00 PM   Result Value Ref Range    Magnesium 0.9 (LL) 1.6 - 2.6 mg/dL       Radiologic Studies -     RADIOLOGY FINDINGS  Chest X-ray shows no acute process  Pending review by Radiologist  Recorded by Abraham Juarez, ED Scribe, as dictated by Antonio Tineo MD     XR CHEST PA LAT    (Results Pending)       Medications Given in the ED:  Medications   sodium chloride 0.9 % bolus infusion 500 mL (0 mL IntraVENous IV Completed 6/7/18 2150)   magnesium sulfate 2 g/50 ml IVPB (premix or compounded) (2 g IntraVENous New Bag 6/7/18 2102)        Medical Decision Making     I am the first provider for this patient. I reviewed the vital signs, available nursing notes, past medical history, past surgical history, family history and social history. Records Reviewed: Nursing Notes    Vital Signs-Reviewed the patient's vital signs. Patient Vitals for the past 12 hrs:   Temp Pulse Resp BP SpO2   06/07/18 1942 97.5 °F (36.4 °C) 70 16 144/64 100 %       Pulse Oximetry Analysis - Normal 100% on RA        Cardiac Monitor:   Rate: 70 bpm  Rhythm: Normal Sinus Rhythm       EKG interpretation: (Preliminary)  Rhythm: NSR. Rate (approx.): 74 bpm. No STEMI. No change from previous.   EKG read by Antonio Tineo MD at 7:54 PM     EKG interpretation: (Subsequent)  Rhythm: Sinus rhythm with 1st degree AV block. Rate (approx.): 79 bpm. No STEMI. No acute changes from initial.   EKG read by Shekhar Bob MD at 9:17 PM     Provider Notes (Medical Decision Making):   82 y.o. female presenting with an episode of weakness to where she slid herself to the floor because she was afraid she would fall. Denies any injuries. Did not hit her head. No LOC. She says that she has had generalized weakness and feeling tired last several days. On her way here she began developing some centralized chest heaviness that has since resolved. She is uncertain if this had something to do with her car ride in ambulance this does not feel like her previous cardiac hx. Her EKG does not show any acute changes. Some mild diffuse depressions; however these are very similar to her previous EKG. Concern this could be related to cardiac etiology. She has generalized weakness as her main compliant. She is do for an iron infusion and this could be a cause for her weakness as well. Will do basic labs, CXR, and UA. Neurologic exam is completely unremarkable, not felt to be related to stroke in any way. Procedures:  Procedures     ED Course:   8:03 PM Initial assessment performed. Pt and/or pt's family are aware of the plan of care and are in agreement. 9:13 PM Patients magnesium was found to be only 0.9, was replete patient was attempted to walk but was unable to do so and became incredibly dizzy, repletion the magnesium and her creatinine is slightly elevated from baseline     10:04 PM Consult Note: Discussed patient's history, exam, and available diagnostics results over the telephone with Tyra Archibald DO , internal medicine, who will admit to Telemetry for hypomagnesia and ambulatory dysfunction likely due to hypomagnesia.     Diagnosis and Disposition       Critical Care Time:   I have spent 30 minutes of critical care time involved in lab review, consultations with specialist, family decision-making, and documentation. During this entire length of time I was immediately available to the patient. Critical Care: The reason for providing this level of medical care for this critically ill patient was due a critical illness that impaired one or more vital organ systems such that there was a high probability of imminent or life threatening deterioration in the patients condition. This care involved high complexity decision making to assess, manipulate, and support vital system functions, to treat this degreee vital organ system failure and to prevent further life threatening deterioration of the patients condition. Core Measures:  For Hospitalized Patients:    1. Hospitalization Decision Time:  The decision to hospitalize the patient was made by Donnell Oscar MD at 10:05 PM on 6/7/2018    2. Aspirin: Aspirin was not given because the patient did not present with a stroke at the time of their Emergency Department evaluation    3. Plan: Admit to Telemetry    10:05 PM Patient is being admitted to the hospital by Kendra Fine DO. The results of their tests and reasons for their admission have been discussed with them and/or available family. They convey agreement and understanding for the need to be admitted and for their admission diagnosis. Conditions on Admission:  Sepsis is not present at the time of admission. Deep Vein Thrombosis is not present at the time of admission. Thrombosis is not present at the time of admission. Urinary Tract Infection is not present at the time of admission. Pneumonia is not present at the time of admission. MRSA is not present at the time of admission. Wound infection is not present at the time of admission. Pressure Ulcer is not present at the time of admission. Clinical Impression:    1. Hypomagnesemia    2. Ambulatory dysfunction         _______________________________    Attestations:   This note is prepared by Ines Bass Alva, acting as Scribe for Carmen Kasper MD.    Carmen Kasper MD:  The scribe's documentation has been prepared under my direction and personally reviewed by me in its entirety.   I confirm that the note above accurately reflects all work, treatment, procedures, and medical decision making performed by me.  _______________________________

## 2018-06-08 NOTE — ROUTINE PROCESS
TRANSFER - OUT REPORT:    Verbal report given to Sims Moritz (name) on Seema Quintero  being transferred to Tele(unit) for routine progression of care       Report consisted of patients Situation, Background, Assessment and   Recommendations(SBAR). Information from the following report(s) ED Summary was reviewed with the receiving nurse. Lines:   Peripheral IV 06/07/18 Right Arm (Active)   Site Assessment Clean, dry, & intact 6/7/2018  8:04 PM   Phlebitis Assessment 0 6/7/2018  8:04 PM   Infiltration Assessment 0 6/7/2018  8:04 PM   Dressing Status Clean, dry, & intact 6/7/2018  8:04 PM   Dressing Type Transparent 6/7/2018  8:04 PM   Alcohol Cap Used Yes 6/7/2018  8:04 PM        Opportunity for questions and clarification was provided.       Patient transported with:   Registered Nurse

## 2018-06-09 ENCOUNTER — APPOINTMENT (OUTPATIENT)
Dept: GENERAL RADIOLOGY | Age: 83
DRG: 863 | End: 2018-06-09
Attending: INTERNAL MEDICINE
Payer: MEDICARE

## 2018-06-09 LAB
ANION GAP SERPL CALC-SCNC: 17 MMOL/L (ref 3–18)
BASOPHILS # BLD: 0 K/UL (ref 0–0.06)
BASOPHILS NFR BLD: 0 % (ref 0–2)
BUN SERPL-MCNC: 56 MG/DL (ref 7–18)
BUN/CREAT SERPL: 37 (ref 12–20)
CALCIUM SERPL-MCNC: 6.4 MG/DL (ref 8.5–10.1)
CHLORIDE SERPL-SCNC: 107 MMOL/L (ref 100–108)
CO2 SERPL-SCNC: 21 MMOL/L (ref 21–32)
CREAT SERPL-MCNC: 1.5 MG/DL (ref 0.6–1.3)
DIFFERENTIAL METHOD BLD: ABNORMAL
EOSINOPHIL # BLD: 0 K/UL (ref 0–0.4)
EOSINOPHIL NFR BLD: 0 % (ref 0–5)
ERYTHROCYTE [DISTWIDTH] IN BLOOD BY AUTOMATED COUNT: 14.5 % (ref 11.6–14.5)
GLUCOSE BLD STRIP.AUTO-MCNC: 126 MG/DL (ref 70–110)
GLUCOSE BLD STRIP.AUTO-MCNC: 190 MG/DL (ref 70–110)
GLUCOSE BLD STRIP.AUTO-MCNC: 330 MG/DL (ref 70–110)
GLUCOSE BLD STRIP.AUTO-MCNC: 337 MG/DL (ref 70–110)
GLUCOSE SERPL-MCNC: 127 MG/DL (ref 74–99)
HCT VFR BLD AUTO: 32.3 % (ref 35–45)
HGB BLD-MCNC: 10.4 G/DL (ref 12–16)
LYMPHOCYTES # BLD: 1 K/UL (ref 0.9–3.6)
LYMPHOCYTES NFR BLD: 9 % (ref 21–52)
MAGNESIUM SERPL-MCNC: 1.8 MG/DL (ref 1.6–2.6)
MCH RBC QN AUTO: 27.9 PG (ref 24–34)
MCHC RBC AUTO-ENTMCNC: 32.2 G/DL (ref 31–37)
MCV RBC AUTO: 86.6 FL (ref 74–97)
MONOCYTES # BLD: 1.2 K/UL (ref 0.05–1.2)
MONOCYTES NFR BLD: 10 % (ref 3–10)
NEUTS SEG # BLD: 9.4 K/UL (ref 1.8–8)
NEUTS SEG NFR BLD: 81 % (ref 40–73)
PHOSPHATE SERPL-MCNC: 3.1 MG/DL (ref 2.5–4.9)
PLATELET # BLD AUTO: 37 K/UL (ref 135–420)
PMV BLD AUTO: 10.1 FL (ref 9.2–11.8)
POTASSIUM SERPL-SCNC: 3.1 MMOL/L (ref 3.5–5.5)
RBC # BLD AUTO: 3.73 M/UL (ref 4.2–5.3)
SODIUM SERPL-SCNC: 145 MMOL/L (ref 136–145)
WBC # BLD AUTO: 11.7 K/UL (ref 4.6–13.2)

## 2018-06-09 PROCEDURE — 74011000250 HC RX REV CODE- 250: Performed by: INTERNAL MEDICINE

## 2018-06-09 PROCEDURE — 74011000258 HC RX REV CODE- 258: Performed by: INTERNAL MEDICINE

## 2018-06-09 PROCEDURE — 74011636637 HC RX REV CODE- 636/637: Performed by: INTERNAL MEDICINE

## 2018-06-09 PROCEDURE — 80048 BASIC METABOLIC PNL TOTAL CA: CPT | Performed by: INTERNAL MEDICINE

## 2018-06-09 PROCEDURE — 83735 ASSAY OF MAGNESIUM: CPT | Performed by: INTERNAL MEDICINE

## 2018-06-09 PROCEDURE — 85025 COMPLETE CBC W/AUTO DIFF WBC: CPT | Performed by: INTERNAL MEDICINE

## 2018-06-09 PROCEDURE — 36415 COLL VENOUS BLD VENIPUNCTURE: CPT | Performed by: INTERNAL MEDICINE

## 2018-06-09 PROCEDURE — 73630 X-RAY EXAM OF FOOT: CPT

## 2018-06-09 PROCEDURE — 74011250637 HC RX REV CODE- 250/637: Performed by: PHYSICIAN ASSISTANT

## 2018-06-09 PROCEDURE — 97116 GAIT TRAINING THERAPY: CPT

## 2018-06-09 PROCEDURE — 74011250637 HC RX REV CODE- 250/637: Performed by: INTERNAL MEDICINE

## 2018-06-09 PROCEDURE — 65660000000 HC RM CCU STEPDOWN

## 2018-06-09 PROCEDURE — 74011000250 HC RX REV CODE- 250: Performed by: PHYSICIAN ASSISTANT

## 2018-06-09 PROCEDURE — 97161 PT EVAL LOW COMPLEX 20 MIN: CPT

## 2018-06-09 PROCEDURE — 82962 GLUCOSE BLOOD TEST: CPT

## 2018-06-09 PROCEDURE — 84100 ASSAY OF PHOSPHORUS: CPT | Performed by: INTERNAL MEDICINE

## 2018-06-09 RX ORDER — POTASSIUM CHLORIDE 20 MEQ/1
20 TABLET, EXTENDED RELEASE ORAL
Status: COMPLETED | OUTPATIENT
Start: 2018-06-09 | End: 2018-06-09

## 2018-06-09 RX ORDER — GUAIFENESIN 100 MG/5ML
81 LIQUID (ML) ORAL
Status: DISCONTINUED | OUTPATIENT
Start: 2018-06-10 | End: 2018-06-11 | Stop reason: HOSPADM

## 2018-06-09 RX ADMIN — SOLIFENACIN SUCCINATE 5 MG: 5 TABLET, FILM COATED ORAL at 12:08

## 2018-06-09 RX ADMIN — INSULIN LISPRO 8 UNITS: 100 INJECTION, SOLUTION INTRAVENOUS; SUBCUTANEOUS at 17:46

## 2018-06-09 RX ADMIN — ACETAMINOPHEN 650 MG: 325 TABLET ORAL at 23:34

## 2018-06-09 RX ADMIN — CARBAMAZEPINE 300 MG: 200 TABLET ORAL at 12:07

## 2018-06-09 RX ADMIN — CILOSTAZOL 50 MG: 50 TABLET ORAL at 08:10

## 2018-06-09 RX ADMIN — INSULIN LISPRO 8 UNITS: 100 INJECTION, SOLUTION INTRAVENOUS; SUBCUTANEOUS at 12:36

## 2018-06-09 RX ADMIN — CLINDAMYCIN PHOSPHATE 300 MG: 150 INJECTION, SOLUTION INTRAVENOUS at 08:10

## 2018-06-09 RX ADMIN — CLINDAMYCIN PHOSPHATE 300 MG: 150 INJECTION, SOLUTION INTRAVENOUS at 22:24

## 2018-06-09 RX ADMIN — CYCLOSPORINE 1 DROP: 0.5 EMULSION OPHTHALMIC at 22:28

## 2018-06-09 RX ADMIN — CARBAMAZEPINE 300 MG: 200 TABLET ORAL at 22:23

## 2018-06-09 RX ADMIN — INSULIN LISPRO 2 UNITS: 100 INJECTION, SOLUTION INTRAVENOUS; SUBCUTANEOUS at 22:24

## 2018-06-09 RX ADMIN — SEVELAMER CARBONATE 800 MG: 800 TABLET, FILM COATED ORAL at 12:09

## 2018-06-09 RX ADMIN — CARVEDILOL 6.25 MG: 3.12 TABLET, FILM COATED ORAL at 17:46

## 2018-06-09 RX ADMIN — CYCLOSPORINE 1 DROP: 0.5 EMULSION OPHTHALMIC at 08:10

## 2018-06-09 RX ADMIN — CLINDAMYCIN PHOSPHATE 300 MG: 150 INJECTION, SOLUTION INTRAVENOUS at 15:45

## 2018-06-09 RX ADMIN — UMECLIDINIUM BROMIDE AND VILANTEROL TRIFENATATE 1 PUFF: 62.5; 25 POWDER RESPIRATORY (INHALATION) at 08:10

## 2018-06-09 RX ADMIN — POTASSIUM CHLORIDE 20 MEQ: 20 TABLET, EXTENDED RELEASE ORAL at 12:25

## 2018-06-09 RX ADMIN — CARVEDILOL 6.25 MG: 3.12 TABLET, FILM COATED ORAL at 12:25

## 2018-06-09 RX ADMIN — CILOSTAZOL 50 MG: 50 TABLET ORAL at 15:45

## 2018-06-09 RX ADMIN — ACETAMINOPHEN 650 MG: 325 TABLET ORAL at 15:45

## 2018-06-09 NOTE — PROGRESS NOTES
0740 BEdside report received from Yan Horan RN.    5623 PT states she has painin left foot radiating up to her leg. Assessed and had her flex her foot back and states it hurts.

## 2018-06-09 NOTE — PROGRESS NOTES
Problem: Falls - Risk of  Goal: *Absence of Falls  Document Juan Carlos Fall Risk and appropriate interventions in the flowsheet.    Outcome: Progressing Towards Goal  Fall Risk Interventions:  Mobility Interventions: Assess mobility with egress test, Communicate number of staff needed for ambulation/transfer, Patient to call before getting OOB, PT Consult for mobility concerns, PT Consult for assist device competence, Utilize walker, cane, or other assitive device         Medication Interventions: Patient to call before getting OOB, Teach patient to arise slowly    Elimination Interventions: Call light in reach, Patient to call for help with toileting needs, Toilet paper/wipes in reach    History of Falls Interventions: Consult care management for discharge planning, Door open when patient unattended, Evaluate medications/consider consulting pharmacy, Investigate reason for fall, Room close to nurse's station

## 2018-06-09 NOTE — PROGRESS NOTES
Hospitalist Progress Note    Patient: Tona Robert MRN: [id number]  CSN: [id number]    YOB: 1935  Age: 82 y.o. Sex: female    DOA: 6/7/2018 LOS:  LOS: 2 days                Assessment and Plan:    Principal Problem:    JONES (acute kidney injury) ([facility] [address] (6/7/2018)    Active Problems:    Hypomagnesemia (6/7/2018)      Ambulatory dysfunction (6/7/2018)      Cellulitis (6/7/2018)         1. JONES  2. Hypomagnesemia  3. Surgical Wound Infection/Cellulitis  4. Chronic Systolic Heart Failure  5. DM2  6. CAD  7. Thrombocytopenia     1. Cr has improved again overnight and is now at baseline. Will continue to monitor with daily BMP. 2. Resolved but will check tomorrow as well as K   3. Continue with IV clindamycin. 4. It is stable   5. Continue with SSI, monitor needs and adjust accordingly. 6. Continue asa and Pletal. Trish are flat. 7. Platelets are low. No sign of bleeding  . Will recheck tomorrow  8. Will check ferritin as she was due for iron infusion       Dispo: PT/OT, pending response to treatment 1-2 days        Chief complaint:  Leg pain and dizziness        Subjective:  Feels better. Foot hurts. Not doing well with physical therapy        Review of systems:    General: No fevers or chills. Cardiovascular: No chest pain or pressure. No palpitations. Pulmonary: No shortness of breath. Gastrointestinal: No nausea, vomiting. Objective:    Vital signs/Intake and Output:  Visit Vitals    /49 (BP 1 Location: Left arm, BP Patient Position: At rest)    Pulse 72    Temp 97.8 °F (36.6 °C)    Resp 17    Ht 5' 5\" (1.651 m)    Wt 64.7 kg (142 lb 11.2 oz)    SpO2 100%    BMI 23.75 kg/m2     Current Shift:  06/09 0701 - 06/09 1900  In: -   Out: 250   Last three shifts:  06/07 1901 - 06/09 0700  In: 240 [P.O.:240]  Out: 1575     Physical Exam:  General: NAD, AAOx3. Non-toxic. HEENT: NC/AT. PERRLA, EOMI.  MMM. Lungs: Nml inspection. CTA B/L. No wheezing, rales or rhonchi. Heart:  S1S2 RRR,  PMI mid 5th IC space. No M/RG. Abdomen: Soft, NT/ND.  BS+. No peritoneal signs. Extremities: No C/C/E. Psych:   Nml affect. Neurologic:  2-12 intact. Strength 5/5 throughout. Sensation symmetrical.          Labs: Results:       Chemistry Recent Labs      06/09/18   0506  06/08/18   0433  06/07/18   2000   GLU  127*  208*  295*   NA  145  141  138   K  3.1*  3.3*  3.7   CL  107  106  100   CO2  21  22  24   BUN  56*  81*  86*   CREA  1.50*  1.84*  2.12*   CA  6.4*  7.2*  7.3*   AGAP  17  13  14   BUCR  37*  44*  41*   AP   --    --   70   TP   --    --   7.2   ALB   --    --   3.1*   GLOB   --    --   4.1*   AGRAT   --    --   0.8      CBC w/Diff Recent Labs      06/09/18   0506  06/08/18   0433  06/07/18   2000   WBC  11.7  12.6  14.5*   RBC  3.73*  3.75*  3.96*   HGB  10.4*  10.5*  11.3*   HCT  32.3*  32.0*  33.8*   PLT  37*  53*  51*   GRANS  81*  82*  82*   LYMPH  9*  9*  10*   EOS  0  0  0      Cardiac Enzymes Recent Labs      06/08/18   1125  06/08/18 0433   CPK  36  33   CKND1  3.9  4.8*      Coagulation No results for input(s): PTP, INR, APTT in the last 72 hours. No lab exists for component: INREXT    Lipid Panel No results found for: CHOL, CHOLPOCT, CHOLX, CHLST, CHOLV, 354525, HDL, LDL, LDLC, DLDLP, 604826, VLDLC, VLDL, TGLX, TRIGL, TRIGP, TGLPOCT, CHHD, CHHDX   BNP No results for input(s): BNPP in the last 72 hours.    Liver Enzymes Recent Labs      06/07/18 2000   TP  7.2   ALB  3.1*   AP  70   SGOT  26      Thyroid Studies Lab Results   Component Value Date/Time    TSH 0.60 06/08/2018 04:33 AM        Procedures/imaging: see electronic medical records for all procedures/Xrays and details which were not copied into this note but were reviewed prior to creation of Plan

## 2018-06-09 NOTE — PROGRESS NOTES
Problem: Mobility Impaired (Adult and Pediatric)  Goal: *Acute Goals and Plan of Care (Insert Text)  Physical Therapy Goals   Initiated 6/9/2018 and to be accomplished within 5-7 day(s)  1. Patient will move from supine <> sit with S in prep for out of bed activity and change of position. 2.  Patient will perform sit<> stand with S with LRAD in prep for transfers/ambulation. 3.  Patient will transfer from bed <> chair with S with LRAD for time up in chair for completion of ADL activity. 4.  Patient will ambulate 150 feet with LRAD/S for improved functional mobility/safe discharge. 5.  Patient will ascend/descend >1 step with contact guard assist for home re-entry as needed. Outcome: Progressing Towards Goal  physical Therapy EVALUATION    Patient: Joan Jarvis (74 y.o. female)  Date: 6/9/2018  Primary Diagnosis: Hypomagnesemia  Ambulatory dysfunction        Precautions:  Fall    ASSESSMENT :  Based on the objective data described below, the patient presents with decrease independence w/ bed mobility, transfers, gait, and step negotiation. Pt seen in supine prior to session w/ telemonitor donned, colostomy bag, and purewic. Pt reports 6/10 pain in L LE prior to session but reports pain meds already being administered. Pt has an incision on the L anterior LE that is red and tender to palpate around the area and c/o pain at the L ankle while donning socks. Pt reports PTA that she was Mod I w/ SPC for community and home. She reports she has had no falls in the last year. Prior to session pt able to don underwear with pad (as she is incontinent) and empty colostomy bag independent. Pt able to able to ambulate to the 50 ft w/ RW/GB but demonstrates a step to antalgic gait, decrease stride length, decrease shaniqua, and decrease step clearance. Pt demonstrated toe touch in the initial phase of gait of the L LE as pt reports increase pain to w/b.  Pt given instructions on proper gait sequencing w/ RW to alleviate pain. Pt transferred back to room secondary to decrease activity tolerance. Pt left in upright chair after session, call bell and tray in reach, nurse notified after session. Patient will benefit from skilled intervention to address the above impairments. Patients rehabilitation potential is considered to be Fair  Factors which may influence rehabilitation potential include:   []         None noted  []         Mental ability/status  [x]         Medical condition  [x]         Home/family situation and support systems  [x]         Safety awareness  [x]         Pain tolerance/management  []         Other:      PLAN :  Recommendations and Planned Interventions:  [x]           Bed Mobility Training             [x]    Neuromuscular Re-Education  [x]           Transfer Training                   []    Orthotic/Prosthetic Training  [x]           Gait Training                          []    Modalities  [x]           Therapeutic Exercises          []    Edema Management/Control  [x]           Therapeutic Activities            [x]    Patient and Family Training/Education  []           Other (comment):    Frequency/Duration: Patient will be followed by physical therapy once/twice daily to address goals. Discharge Recommendations: Rehab vs Home Health pending pt's progress   Further Equipment Recommendations for Discharge: N/A     SUBJECTIVE:   Patient stated This ankle has been cramping all day. 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    Carotid disease, bilateral (HCC)     Chronic kidney disease     renal insufficiency    Chronic obstructive pulmonary disease (COPD) ([address]     Diabetic neuropathy (HCC)     Emphysema     Heart murmur     Hypercholesterolemia     Hypertension     Multi-vessel coronary artery disease     Myocardial infarction ([facility] [address]     Pelvic fracture [facility]) 01/2015    Peripheral vascular disease and claudication     Seizure disorder (HCC)     Seizures ([facility] [address]     Stroke [facility])      Past Surgical History:   Procedure Laterality Date    CABG, ARTERY-VEIN, THREE  circa 2008    HX CAROTID ENDARTERECTOMY      HX COLECTOMY      HX CORONARY ARTERY BYPASS GRAFT  2009    HX HEART CATHETERIZATION       Barriers to Learning/Limitations: yes;  physical  Compensate with: Verbal Cues and Tactile Cues  Prior Level of Function/Home Situation:   Home Situation  Home Environment: Private residence  # Steps to Enter: 1  Rails to Enter: No  One/Two Story Residence: Two story, live on 1st floor  # of Interior Steps: 15  Living Alone: Yes  Support Systems: Child(mendy)  Patient Expects to be Discharged to[de-identified] Private residence  Current DME Used/Available at Home: Walker, rolling  Tub or Shower Type: Shower  Critical Behavior:  Neurologic State: Alert  Orientation Level: Oriented X4  Cognition: Follows commands  Safety/Judgement: Awareness of environment; Fall prevention  Psychosocial  Purposeful Interaction: Yes  Pt Identified Daily Priority: Clinical issues (comment)  Caritas Process: Nurture loving kindness;Establish trust;Nurture spiritual self;Teaching/learning; Attend basic human needs;Create healing environment  Caring Interventions: Reassure; Therapeutic modalities  Reassure: Therapeutic listening; Informing; Acceptance;Caring rounds;Quiet presence  Therapeutic Modalities: Humor; Intentional therapeutic touch  Strength:    Strength: Generally decreased, functional  Tone & Sensation:   Tone: Normal  Sensation: Intact  Range Of Motion:  AROM: Generally decreased, functional  Functional Mobility:  Bed Mobility:  Supine to Sit: Supervision;Contact guard assistance (HOB Elevated)  Transfers:  Sit to Stand: Contact guard assistance;Minimum assistance  Stand to Sit: Contact guard assistance  Balance:   Sitting: Intact; Without support  Standing: Intact; With support  Ambulation/Gait Training:  Distance (ft): 50 Feet (ft)  Assistive Device: Gait belt;Walker, rolling  Ambulation - Level of Assistance: Contact guard assistance  Gait Description (WDL): Exceptions to WDL  Gait Abnormalities: Antalgic;Decreased step clearance; Toe walking  Base of Support: Widened  Stance: Left decreased  Speed/Nia: Slow  Step Length: Left shortened;Right shortened  Swing Pattern: Left asymmetrical;Right asymmetrical  Pain:  Pain Scale 1: Numeric (0 - 10)  Pain Intensity 1: 6  Pain Location 1: Leg  Pain Orientation 1: Left  Activity Tolerance:   Fair  Please refer to the flowsheet for vital signs taken during this treatment. After treatment:   [x]         Patient left in no apparent distress sitting up in chair  []         Patient left in no apparent distress in bed  [x]         Call bell left within reach  [x]         Nursing notified  []         Caregiver present  []         Bed alarm activated    COMMUNICATION/EDUCATION:   [x]         Fall prevention education was provided and the patient/caregiver indicated understanding. [x]         Patient/family have participated as able in goal setting and plan of care. [x]         Patient/family agree to work toward stated goals and plan of care. []         Patient understands intent and goals of therapy, but is neutral about his/her participation. []         Patient is unable to participate in goal setting and plan of care. Thank you for this referral.  Kelsey Valdivia, PT   Time Calculation: 29 mins     Mobility:  Current  CJ= 20-39%   Goal  CI= 1-19%. The severity rating is based on the Other Based on functional assessment

## 2018-06-10 LAB
ALBUMIN SERPL-MCNC: 2.3 G/DL (ref 3.4–5)
ALBUMIN/GLOB SERPL: 0.6 {RATIO} (ref 0.8–1.7)
ALP SERPL-CCNC: 65 U/L (ref 45–117)
ALT SERPL-CCNC: 28 U/L (ref 13–56)
ANION GAP SERPL CALC-SCNC: 14 MMOL/L (ref 3–18)
AST SERPL-CCNC: 23 U/L (ref 15–37)
ATRIAL RATE: 74 BPM
ATRIAL RATE: 79 BPM
BASOPHILS # BLD: 0 K/UL (ref 0–0.1)
BASOPHILS NFR BLD: 0 % (ref 0–3)
BILIRUB SERPL-MCNC: 0.4 MG/DL (ref 0.2–1)
BUN SERPL-MCNC: 45 MG/DL (ref 7–18)
BUN/CREAT SERPL: 32 (ref 12–20)
CALCIUM SERPL-MCNC: 6.5 MG/DL (ref 8.5–10.1)
CALCULATED P AXIS, ECG09: 75 DEGREES
CALCULATED P AXIS, ECG09: 78 DEGREES
CALCULATED R AXIS, ECG10: 40 DEGREES
CALCULATED R AXIS, ECG10: 52 DEGREES
CALCULATED T AXIS, ECG11: -122 DEGREES
CALCULATED T AXIS, ECG11: -123 DEGREES
CHLORIDE SERPL-SCNC: 109 MMOL/L (ref 100–108)
CO2 SERPL-SCNC: 20 MMOL/L (ref 21–32)
CREAT SERPL-MCNC: 1.39 MG/DL (ref 0.6–1.3)
DIAGNOSIS, 93000: NORMAL
DIAGNOSIS, 93000: NORMAL
DIFFERENTIAL METHOD BLD: ABNORMAL
EOSINOPHIL # BLD: 0 K/UL (ref 0–0.4)
EOSINOPHIL NFR BLD: 0 % (ref 0–5)
ERYTHROCYTE [DISTWIDTH] IN BLOOD BY AUTOMATED COUNT: 14.8 % (ref 11.6–14.5)
FERRITIN SERPL-MCNC: 25 NG/ML (ref 8–388)
GLOBULIN SER CALC-MCNC: 3.7 G/DL (ref 2–4)
GLUCOSE BLD STRIP.AUTO-MCNC: 133 MG/DL (ref 70–110)
GLUCOSE BLD STRIP.AUTO-MCNC: 223 MG/DL (ref 70–110)
GLUCOSE BLD STRIP.AUTO-MCNC: 264 MG/DL (ref 70–110)
GLUCOSE BLD STRIP.AUTO-MCNC: 272 MG/DL (ref 70–110)
GLUCOSE SERPL-MCNC: 129 MG/DL (ref 74–99)
HCT VFR BLD AUTO: 30.9 % (ref 35–45)
HGB BLD-MCNC: 10 G/DL (ref 12–16)
LYMPHOCYTES # BLD: 0.3 K/UL (ref 0.8–3.5)
LYMPHOCYTES NFR BLD: 3 % (ref 20–51)
MAGNESIUM SERPL-MCNC: 1.5 MG/DL (ref 1.6–2.6)
MCH RBC QN AUTO: 28.2 PG (ref 24–34)
MCHC RBC AUTO-ENTMCNC: 32.4 G/DL (ref 31–37)
MCV RBC AUTO: 87 FL (ref 74–97)
MONOCYTES # BLD: 1.5 K/UL (ref 0–1)
MONOCYTES NFR BLD: 13 % (ref 2–9)
NEUTS SEG # BLD: 9.7 K/UL (ref 1.8–8)
NEUTS SEG NFR BLD: 84 % (ref 42–75)
P-R INTERVAL, ECG05: 190 MS
P-R INTERVAL, ECG05: 220 MS
PHOSPHATE SERPL-MCNC: 2.5 MG/DL (ref 2.5–4.9)
PLATELET # BLD AUTO: 30 K/UL (ref 135–420)
PLATELET COMMENTS,PCOM: ABNORMAL
PMV BLD AUTO: 10.7 FL (ref 9.2–11.8)
POTASSIUM SERPL-SCNC: 3.5 MMOL/L (ref 3.5–5.5)
PROT SERPL-MCNC: 6 G/DL (ref 6.4–8.2)
Q-T INTERVAL, ECG07: 412 MS
Q-T INTERVAL, ECG07: 432 MS
QRS DURATION, ECG06: 104 MS
QRS DURATION, ECG06: 96 MS
QTC CALCULATION (BEZET), ECG08: 457 MS
QTC CALCULATION (BEZET), ECG08: 495 MS
RBC # BLD AUTO: 3.55 M/UL (ref 4.2–5.3)
RBC MORPH BLD: ABNORMAL
SODIUM SERPL-SCNC: 143 MMOL/L (ref 136–145)
VENTRICULAR RATE, ECG03: 74 BPM
VENTRICULAR RATE, ECG03: 79 BPM
WBC # BLD AUTO: 11.5 K/UL (ref 4.6–13.2)
WBC MORPH BLD: ABNORMAL

## 2018-06-10 PROCEDURE — 82962 GLUCOSE BLOOD TEST: CPT

## 2018-06-10 PROCEDURE — 83735 ASSAY OF MAGNESIUM: CPT | Performed by: INTERNAL MEDICINE

## 2018-06-10 PROCEDURE — 74011000258 HC RX REV CODE- 258: Performed by: INTERNAL MEDICINE

## 2018-06-10 PROCEDURE — 74011250636 HC RX REV CODE- 250/636: Performed by: INTERNAL MEDICINE

## 2018-06-10 PROCEDURE — 74011000250 HC RX REV CODE- 250: Performed by: INTERNAL MEDICINE

## 2018-06-10 PROCEDURE — 85025 COMPLETE CBC W/AUTO DIFF WBC: CPT | Performed by: INTERNAL MEDICINE

## 2018-06-10 PROCEDURE — 36415 COLL VENOUS BLD VENIPUNCTURE: CPT | Performed by: INTERNAL MEDICINE

## 2018-06-10 PROCEDURE — 84100 ASSAY OF PHOSPHORUS: CPT | Performed by: INTERNAL MEDICINE

## 2018-06-10 PROCEDURE — 74011000250 HC RX REV CODE- 250: Performed by: PHYSICIAN ASSISTANT

## 2018-06-10 PROCEDURE — 97165 OT EVAL LOW COMPLEX 30 MIN: CPT

## 2018-06-10 PROCEDURE — 74011250637 HC RX REV CODE- 250/637: Performed by: PHYSICIAN ASSISTANT

## 2018-06-10 PROCEDURE — 82728 ASSAY OF FERRITIN: CPT | Performed by: INTERNAL MEDICINE

## 2018-06-10 PROCEDURE — 80053 COMPREHEN METABOLIC PANEL: CPT | Performed by: INTERNAL MEDICINE

## 2018-06-10 PROCEDURE — 65660000000 HC RM CCU STEPDOWN

## 2018-06-10 PROCEDURE — 74011636637 HC RX REV CODE- 636/637: Performed by: INTERNAL MEDICINE

## 2018-06-10 RX ORDER — MAGNESIUM SULFATE HEPTAHYDRATE 40 MG/ML
2 INJECTION, SOLUTION INTRAVENOUS ONCE
Status: COMPLETED | OUTPATIENT
Start: 2018-06-10 | End: 2018-06-10

## 2018-06-10 RX ADMIN — CARVEDILOL 6.25 MG: 3.12 TABLET, FILM COATED ORAL at 18:07

## 2018-06-10 RX ADMIN — CARVEDILOL 6.25 MG: 3.12 TABLET, FILM COATED ORAL at 09:05

## 2018-06-10 RX ADMIN — SEVELAMER CARBONATE 800 MG: 800 TABLET, FILM COATED ORAL at 12:21

## 2018-06-10 RX ADMIN — UMECLIDINIUM BROMIDE AND VILANTEROL TRIFENATATE 1 PUFF: 62.5; 25 POWDER RESPIRATORY (INHALATION) at 09:06

## 2018-06-10 RX ADMIN — MAGNESIUM SULFATE HEPTAHYDRATE 2 G: 40 INJECTION, SOLUTION INTRAVENOUS at 12:52

## 2018-06-10 RX ADMIN — CARBAMAZEPINE 300 MG: 200 TABLET ORAL at 09:05

## 2018-06-10 RX ADMIN — CARBAMAZEPINE 300 MG: 200 TABLET ORAL at 21:30

## 2018-06-10 RX ADMIN — CLINDAMYCIN PHOSPHATE 300 MG: 150 INJECTION, SOLUTION INTRAVENOUS at 06:15

## 2018-06-10 RX ADMIN — CYCLOSPORINE 1 DROP: 0.5 EMULSION OPHTHALMIC at 21:32

## 2018-06-10 RX ADMIN — INSULIN LISPRO 4 UNITS: 100 INJECTION, SOLUTION INTRAVENOUS; SUBCUTANEOUS at 18:08

## 2018-06-10 RX ADMIN — INSULIN LISPRO 9 UNITS: 100 INJECTION, SOLUTION INTRAVENOUS; SUBCUTANEOUS at 21:48

## 2018-06-10 RX ADMIN — CYCLOSPORINE 1 DROP: 0.5 EMULSION OPHTHALMIC at 12:20

## 2018-06-10 RX ADMIN — SOLIFENACIN SUCCINATE 5 MG: 5 TABLET, FILM COATED ORAL at 09:05

## 2018-06-10 RX ADMIN — INSULIN LISPRO 6 UNITS: 100 INJECTION, SOLUTION INTRAVENOUS; SUBCUTANEOUS at 12:20

## 2018-06-10 RX ADMIN — CILOSTAZOL 50 MG: 50 TABLET ORAL at 06:15

## 2018-06-10 NOTE — PROGRESS NOTES
Chart reviewed. Met with patient at bedside. Offered FOC for rehab. Pt chose [facility] (first choice), and stated she would consider any rehab in [place]. For continuity of care referrals will be placed to [place] [address] and [facility]. CM will cont to follow.

## 2018-06-10 NOTE — PROGRESS NOTES
Hospitalist Progress Note    Patient: Trace Jenkins MRN: [id number]  CSN: [id number]    YOB: 1935  Age: 82 y.o. Sex: female    DOA: 6/7/2018 LOS:  LOS: 3 days                Assessment and Plan:    Principal Problem:    JONES (acute kidney injury) ([facility] [address] (6/7/2018)    Active Problems:    Hypomagnesemia (6/7/2018)      Ambulatory dysfunction (6/7/2018)      Cellulitis (6/7/2018)          1. JONES  2. Hypomagnesemia  3. Surgical Wound Infection/Cellulitis  4. Chronic Systolic Heart Failure  5. DM2  6. CAD  7. Thrombocytopenia      1. Cr has improved again overnight and is now at baseline. Will continue to monitor with daily BMP. 2. Low again today and will replace and will check tomorrow as well as K   3. Legs look better today. Will stop the clindamycin to see if it will help with nausea    4. It is stable   5. Continue with SSI, monitor needs and adjust accordingly. 6. Continue asa and Pletal. Trihs are flat. 7. Platelets stay  low. No sign of bleeding  . Will recheck tomorrow        Dispo: PT/OT, pending response to treatment 1-2 days           Chief complaint:  Leg pain and dizziness        Subjective: Ate good breakfast.    Still nauseated       Review of systems:    General: No fevers or chills. Cardiovascular: No chest pain or pressure. No palpitations. Pulmonary: No shortness of breath. Gastrointestinal: No nausea, vomiting. Objective:    Vital signs/Intake and Output:  Visit Vitals    /53 (BP 1 Location: Left arm, BP Patient Position: At rest)    Pulse 83    Temp 98.7 °F (37.1 °C)    Resp 16    Ht 5' 5\" (1.651 m)    Wt 66.4 kg (146 lb 6.2 oz)    SpO2 98%    BMI 24.36 kg/m2     Current Shift:  06/10 0701 - 06/10 1900  In: 360 [P.O.:360]  Out: 550   Last three shifts:  06/08 1901 - 06/10 0700  In: 580 [P.O.:480; I.V.:100]  Out: 4495 [Urine:2050]    Physical Exam:  General: NAD, AAOx3. Non-toxic. HEENT: NC/AT. PERRLA, EOMI.  MMM. Lungs: Nml inspection.  CTA B/L. No wheezing, rales or rhonchi. Heart:  S1S2 RRR,  PMI mid 5th IC space. No M/RG. Abdomen: Soft, NT/ND.  BS+. No peritoneal signs. Extremities: No C/C/E. Psych:   Nml affect. Neurologic:  2-12 intact. Strength 5/5 throughout. Sensation symmetrical.          Labs: Results:       Chemistry Recent Labs      06/10/18   0625  06/09/18   0506  06/08/18   0433  06/07/18 2000   GLU  129*  127*  208*  295*   NA  143  145  141  138   K  3.5  3.1*  3.3*  3.7   CL  109*  107  106  100   CO2  20*  21  22  24   BUN  45*  56*  81*  86*   CREA  1.39*  1.50*  1.84*  2.12*   CA  6.5*  6.4*  7.2*  7.3*   AGAP  14  17  13  14   BUCR  32*  37*  44*  41*   AP  65   --    --   70   TP  6.0*   --    --   7.2   ALB  2.3*   --    --   3.1*   GLOB  3.7   --    --   4.1*   AGRAT  0.6*   --    --   0.8      CBC w/Diff Recent Labs      06/10/18   0625 06/09/18   0506  06/08/18   0433   WBC  11.5  11.7  12.6   RBC  3.55*  3.73*  3.75*   HGB  10.0*  10.4*  10.5*   HCT  30.9*  32.3*  32.0*   PLT  30*  37*  53*   GRANS  84*  81*  82*   LYMPH  3*  9*  9*   EOS  0  0  0      Cardiac Enzymes Recent Labs      06/08/18   1125  06/08/18   0433   CPK  36  33   CKND1  3.9  4.8*      Coagulation No results for input(s): PTP, INR, APTT in the last 72 hours. No lab exists for component: INREXT    Lipid Panel No results found for: CHOL, CHOLPOCT, CHOLX, CHLST, CHOLV, 690962, HDL, LDL, LDLC, DLDLP, 647143, VLDLC, VLDL, TGLX, TRIGL, TRIGP, TGLPOCT, CHHD, CHHDX   BNP No results for input(s): BNPP in the last 72 hours.    Liver Enzymes Recent Labs      06/10/18   0625   TP  6.0*   ALB  2.3*   AP  65   SGOT  23      Thyroid Studies Lab Results   Component Value Date/Time    TSH 0.60 06/08/2018 04:33 AM        Procedures/imaging: see electronic medical records for all procedures/Xrays and details which were not copied into this note but were reviewed prior to creation of Plan

## 2018-06-10 NOTE — ROUTINE PROCESS
Bedside and Verbal shift change report given to Royal Ahumada, RN (oncoming nurse) by Steve Cifuentes RN   (offgoing nurse). Report included the following information SBAR, Kardex, Intake/Output, MAR, Recent Results and Med Rec Status.

## 2018-06-10 NOTE — PROGRESS NOTES
Bedside and verbal report given to Gill Muhammad RN, with use of kardex. Rounded on pt Q 1 hour throughout shift, no s/s distress nor discomfort noted, no s/s active bleeding noted, call bell in reach.

## 2018-06-10 NOTE — PROGRESS NOTES
Problem: Falls - Risk of  Goal: *Absence of Falls  Document Juan Carlos Fall Risk and appropriate interventions in the flowsheet.    Outcome: Progressing Towards Goal  Fall Risk Interventions:  Mobility Interventions: Patient to call before getting OOB, PT Consult for mobility concerns         Medication Interventions: Evaluate medications/consider consulting pharmacy, Patient to call before getting OOB, Teach patient to arise slowly    Elimination Interventions: Call light in reach, Patient to call for help with toileting needs, Toilet paper/wipes in reach, Toileting schedule/hourly rounds    History of Falls Interventions: Consult care management for discharge planning, Door open when patient unattended, Evaluate medications/consider consulting pharmacy, Investigate reason for fall

## 2018-06-10 NOTE — PROGRESS NOTES
Problem: Self Care Deficits Care Plan (Adult)  Goal: *Acute Goals and Plan of Care (Insert Text)  Short Term Goals 6/10/18:  Farooq Gómez OTR/L  In one week:  1. Pt will perform UB self care with modified independence in seated position in order to increase independence with self care. 2.  Pt will perform LB self care with min A and cues in order to increase independence with self care. 3.  Pt will safely perform toilet transfers and toilet routine with supervision in order to increase independence with bowel and bladder management. 4.  Pt will improve standing tolerance to 8 minutes without LOB in order to perform grooming tasks with distant supervision at sink. Outcome: Progressing Towards Goal  Occupational Therapy EVALUATION    Patient: Sherren Hesselbach (61 y.o. female)  Date: 6/10/2018  Primary Diagnosis: Hypomagnesemia  Ambulatory dysfunction        Precautions:   WBAT, Fall    ASSESSMENT :  Based on the objective data described below, the patient presents with decreased functional strength, decreased functional balance, decreased overall activity tolerance limiting independence with ADLs. Pt with limited OT session due to poor endurance. Pt only able to sit EOB; declined to try transfer; declined self care tasks. Pt reports \"it just wipes me out\". Pt would benefit from continued OT services to maximize function. Pt lives alone and reports she is worried how she will make it on her own. Education:fall prevention; safety    Patient will benefit from skilled intervention to address the above impairments.   Patients rehabilitation potential is considered to be Good  Factors which may influence rehabilitation potential include:   []             None noted  []             Mental ability/status  []             Medical condition  [x]             Home/family situation and support systems  []             Safety awareness  []             Pain tolerance/management  []             Other:      PLAN :  Recommendations and Planned Interventions:  [x]               Self Care Training                  [x]        Therapeutic Activities  [x]               Functional Mobility Training    []        Cognitive Retraining  [x]               Therapeutic Exercises           [x]        Endurance Activities  [x]               Balance Training                   [x]        Neuromuscular Re-Education  []               Visual/Perceptual Training     [x]   Home Safety Training  [x]               Patient Education                 [x]        Family Training/Education  []               Other (comment):    Frequency/Duration: Patient will be followed by occupational therapy 3X per week to address goals. Discharge Recommendations: Skilled Nursing Facility  Further Equipment Recommendations for Discharge: TBD     SUBJECTIVE:   Patient stated I just do not feel like I can do it today. 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    Carotid disease, bilateral (HCC)     Chronic kidney disease     renal insufficiency    Chronic obstructive pulmonary disease (COPD) (HCC)     Diabetic neuropathy (HCC)     Emphysema     Heart murmur     Hypercholesterolemia     Hypertension     Multi-vessel coronary artery disease     Myocardial infarction ([facility] [address]     Pelvic fracture ([facility] [address] 01/2015    Peripheral vascular disease and claudication     [facility]     Seizures ([facility] [address]     Stroke [facility])      Past Surgical History:   Procedure Laterality Date    CABG, ARTERY-VEIN, THREE  circa 2008    HX CAROTID ENDARTERECTOMY      HX COLECTOMY      HX CORONARY ARTERY BYPASS GRAFT  2009    HX HEART CATHETERIZATION       Barriers to Learning/Limitations: None  Compensate with: visual, verbal, tactile, kinesthetic cues/model    G-Codes (GP)  Self Care   Current  CM= 80-99%   Goal  CJ= 20-39%  The severity rating is based on the professional judgement & direct observation of Level of Assistance required for Functional Mobility and ADLs. Eval Complexity: History: MEDIUM Complexity : Expanded review of history including physical, cognitive and psychosocial  history ; Examination: LOW Complexity : 1-3 performance deficits relating to physical, cognitive , or psychosocial skils that result in activity limitations and / or participation restrictions ; Decision Making:LOW Complexity : No comorbidities that affect functional and no verbal or physical assistance needed to complete eval tasks     Prior Level of Function/Home Situation: Pt lives alone. Pt reports fully independent with all ADLs and IADLs. Pt has friend who assists with transportation and shopping. Home Situation  Home Environment: Private residence  # Steps to Enter: 1  Rails to Enter: No  One/Two Story Residence: Two story, live on 1st floor  # of Interior Steps: 15  Living Alone: Yes  Support Systems: Friends \ neighbors  Patient Expects to be Discharged to[employer] residence  Current DME Used/Available at Home: Grab bars, Walker, rolling, Cane, straight, Raised toilet seat  Tub or Shower Type: Shower  Cognitive/Behavioral Status:  Neurologic State: Alert; Appropriate for age  Orientation Level: Oriented X4  Cognition: Follows commands; Appropriate decision making  Safety/Judgement: Awareness of environment  Skin: site of incision L lower anterior leg  Edema: mild bilateral feet/ankles  Vision/Perceptual:  glasses  Coordination:   Fine Motor Skills-Upper: Left Intact; Right Intact    Gross Motor Skills-Upper: Left Intact; Right Intact  Balance:  Sitting: Intact  Functional Mobility and Transfers for ADLs:  Bed Mobility:  Rolling: Contact guard assistance  Supine to Sit: Contact guard assistance  Sit to Supine: Contact guard assistance  Scooting: Contact guard assistance  Transfers: Pt declined   ADL Assessment:   Upper Body Dressing: Contact guard assistance  Lower Body Dressing: Moderate assistance  Toileting: Moderate assistance      ADL Intervention:    Cognitive Retraining  Safety/Judgement: Awareness of environment    Pain:  Pre-treatment: 1/10  Post-treatment: 2/10  Activity Tolerance:   Poor; unable to tolerate; +SOB; unsteady  Please refer to the flowsheet for vital signs taken during this treatment. After treatment:   [] Patient left in no apparent distress sitting up in chair  [x] Patient left in no apparent distress in bed  [x] Call bell left within reach  [x] Nursing notified  [] Caregiver present  [] Bed alarm activated    COMMUNICATION/EDUCATION:   [] Home safety education was provided and the patient/caregiver indicated understanding. [x] Patient/family have participated as able in goal setting and plan of care. [x] Patient/family agree to work toward stated goals and plan of care. [] Patient understands intent and goals of therapy, but is neutral about his/her participation. [] Patient is unable to participate in goal setting and plan of care.     Thank you for this referral.  Robe Aguilar, OT  Time Calculation: 17 mins

## 2018-06-10 NOTE — PROGRESS NOTES
0734 Bedside report received from Flakita Britton RN. Shift uneventful. Held Petal think it may be related to low platelet count or kidney function.

## 2018-06-10 NOTE — PROGRESS NOTES
Problem: Mobility Impaired (Adult and Pediatric)  Goal: *Acute Goals and Plan of Care (Insert Text)  Physical Therapy Goals   Initiated 6/9/2018 and to be accomplished within 5-7 day(s)  1. Patient will move from supine <> sit with S in prep for out of bed activity and change of position. 2.  Patient will perform sit<> stand with S with LRAD in prep for transfers/ambulation. 3.  Patient will transfer from bed <> chair with S with LRAD for time up in chair for completion of ADL activity. 4.  Patient will ambulate 150 feet with LRAD/S for improved functional mobility/safe discharge. 5.  Patient will ascend/descend >1 step with contact guard assist for home re-entry as needed. Outcome: Progressing Towards Goal  Pt refused PT due to:  [x]  Nausea/vomiting, pt reports she has been feeling sick all day today and does not want to participate with PT treatment today. Nurse made aware.   []  Eating  []  Pain  []  Pt lethargic  []  Off Unit  Will f/u tomorrow as schedule allows. Thank you.   Andres Vargas, PT

## 2018-06-11 VITALS
DIASTOLIC BLOOD PRESSURE: 72 MMHG | HEART RATE: 67 BPM | TEMPERATURE: 98.4 F | OXYGEN SATURATION: 99 % | HEIGHT: 65 IN | SYSTOLIC BLOOD PRESSURE: 165 MMHG | RESPIRATION RATE: 16 BRPM | WEIGHT: 144.2 LBS | BODY MASS INDEX: 24.03 KG/M2

## 2018-06-11 LAB
ANION GAP SERPL CALC-SCNC: 14 MMOL/L (ref 3–18)
BUN SERPL-MCNC: 44 MG/DL (ref 7–18)
BUN/CREAT SERPL: 30 (ref 12–20)
CALCIUM SERPL-MCNC: 6.6 MG/DL (ref 8.5–10.1)
CHLORIDE SERPL-SCNC: 107 MMOL/L (ref 100–108)
CO2 SERPL-SCNC: 21 MMOL/L (ref 21–32)
CREAT SERPL-MCNC: 1.46 MG/DL (ref 0.6–1.3)
ERYTHROCYTE [DISTWIDTH] IN BLOOD BY AUTOMATED COUNT: 15 % (ref 11.6–14.5)
GLUCOSE BLD STRIP.AUTO-MCNC: 132 MG/DL (ref 70–110)
GLUCOSE BLD STRIP.AUTO-MCNC: 280 MG/DL (ref 70–110)
GLUCOSE SERPL-MCNC: 143 MG/DL (ref 74–99)
HCT VFR BLD AUTO: 31.1 % (ref 35–45)
HGB BLD-MCNC: 10 G/DL (ref 12–16)
MAGNESIUM SERPL-MCNC: 2 MG/DL (ref 1.6–2.6)
MCH RBC QN AUTO: 27.9 PG (ref 24–34)
MCHC RBC AUTO-ENTMCNC: 32.2 G/DL (ref 31–37)
MCV RBC AUTO: 86.9 FL (ref 74–97)
PHOSPHATE SERPL-MCNC: 2.6 MG/DL (ref 2.5–4.9)
PLATELET # BLD AUTO: 45 K/UL (ref 135–420)
POTASSIUM SERPL-SCNC: 3.3 MMOL/L (ref 3.5–5.5)
RBC # BLD AUTO: 3.58 M/UL (ref 4.2–5.3)
SODIUM SERPL-SCNC: 142 MMOL/L (ref 136–145)
WBC # BLD AUTO: 11.4 K/UL (ref 4.6–13.2)

## 2018-06-11 PROCEDURE — 97530 THERAPEUTIC ACTIVITIES: CPT

## 2018-06-11 PROCEDURE — 74011636637 HC RX REV CODE- 636/637: Performed by: INTERNAL MEDICINE

## 2018-06-11 PROCEDURE — 80048 BASIC METABOLIC PNL TOTAL CA: CPT | Performed by: HOSPITALIST

## 2018-06-11 PROCEDURE — 85027 COMPLETE CBC AUTOMATED: CPT | Performed by: HOSPITALIST

## 2018-06-11 PROCEDURE — 83735 ASSAY OF MAGNESIUM: CPT | Performed by: HOSPITALIST

## 2018-06-11 PROCEDURE — 84100 ASSAY OF PHOSPHORUS: CPT | Performed by: HOSPITALIST

## 2018-06-11 PROCEDURE — 74011000250 HC RX REV CODE- 250: Performed by: PHYSICIAN ASSISTANT

## 2018-06-11 PROCEDURE — 36415 COLL VENOUS BLD VENIPUNCTURE: CPT | Performed by: HOSPITALIST

## 2018-06-11 PROCEDURE — 74011250636 HC RX REV CODE- 250/636: Performed by: PHYSICIAN ASSISTANT

## 2018-06-11 PROCEDURE — 74011250637 HC RX REV CODE- 250/637: Performed by: PHYSICIAN ASSISTANT

## 2018-06-11 PROCEDURE — 93971 EXTREMITY STUDY: CPT

## 2018-06-11 PROCEDURE — 82962 GLUCOSE BLOOD TEST: CPT

## 2018-06-11 PROCEDURE — 74011250636 HC RX REV CODE- 250/636: Performed by: INTERNAL MEDICINE

## 2018-06-11 RX ORDER — AMOXICILLIN AND CLAVULANATE POTASSIUM 500; 125 MG/1; MG/1
1 TABLET, FILM COATED ORAL 2 TIMES DAILY
Qty: 20 TAB | Refills: 0 | Status: SHIPPED
Start: 2018-06-11 | End: 2018-06-21

## 2018-06-11 RX ORDER — CARVEDILOL 6.25 MG/1
6.25 TABLET ORAL 2 TIMES DAILY WITH MEALS
Qty: 60 TAB | Refills: 0 | Status: SHIPPED | OUTPATIENT
Start: 2018-06-11

## 2018-06-11 RX ORDER — POTASSIUM CHLORIDE 1.5 G/1.77G
20 POWDER, FOR SOLUTION ORAL
Status: COMPLETED | OUTPATIENT
Start: 2018-06-11 | End: 2018-06-11

## 2018-06-11 RX ADMIN — CILOSTAZOL 50 MG: 50 TABLET ORAL at 16:34

## 2018-06-11 RX ADMIN — CARBAMAZEPINE 300 MG: 200 TABLET ORAL at 11:46

## 2018-06-11 RX ADMIN — CYCLOSPORINE 1 DROP: 0.5 EMULSION OPHTHALMIC at 11:47

## 2018-06-11 RX ADMIN — INSULIN LISPRO 9 UNITS: 100 INJECTION, SOLUTION INTRAVENOUS; SUBCUTANEOUS at 11:53

## 2018-06-11 RX ADMIN — ONDANSETRON 4 MG: 2 INJECTION INTRAMUSCULAR; INTRAVENOUS at 15:23

## 2018-06-11 RX ADMIN — IRON SUCROSE 300 MG: 20 INJECTION, SOLUTION INTRAVENOUS at 14:48

## 2018-06-11 RX ADMIN — UMECLIDINIUM BROMIDE AND VILANTEROL TRIFENATATE 1 PUFF: 62.5; 25 POWDER RESPIRATORY (INHALATION) at 11:47

## 2018-06-11 RX ADMIN — SOLIFENACIN SUCCINATE 5 MG: 5 TABLET, FILM COATED ORAL at 11:47

## 2018-06-11 RX ADMIN — CARVEDILOL 6.25 MG: 3.12 TABLET, FILM COATED ORAL at 16:34

## 2018-06-11 RX ADMIN — SEVELAMER CARBONATE 800 MG: 800 TABLET, FILM COATED ORAL at 11:47

## 2018-06-11 RX ADMIN — CARVEDILOL 6.25 MG: 3.12 TABLET, FILM COATED ORAL at 11:46

## 2018-06-11 RX ADMIN — POTASSIUM CHLORIDE 20 MEQ: 1.5 POWDER, FOR SOLUTION ORAL at 11:47

## 2018-06-11 NOTE — PROGRESS NOTES
Problem: Mobility Impaired (Adult and Pediatric)  Goal: *Acute Goals and Plan of Care (Insert Text)  Physical Therapy Goals   Initiated 6/9/2018 and to be accomplished within 5-7 day(s)  1. Patient will move from supine <> sit with S in prep for out of bed activity and change of position. 2.  Patient will perform sit<> stand with S with LRAD in prep for transfers/ambulation. 3.  Patient will transfer from bed <> chair with S with LRAD for time up in chair for completion of ADL activity. 4.  Patient will ambulate 150 feet with LRAD/S for improved functional mobility/safe discharge. 5.  Patient will ascend/descend >1 step with contact guard assist for home re-entry as needed. Outcome: Progressing Towards Goal  physical Therapy TREATMENT    Patient: Silvano Martinez (3 y.o. female)  Date: 6/11/2018  Diagnosis: Hypomagnesemia  Ambulatory dysfunction JONES (acute kidney injury) ([facility] [address]  Precautions: Fall, WBAT   Chart, physical therapy assessment, plan of care and goals were reviewed. ASSESSMENT:  Pt motivated to participate with PT at this time but with c/o increased dizziness today as compared to previous PT sessions. Pt required Quinn for sit-stand transfer with RW/GB use and bed raised to higher level. Once standing pt demonstrated fair static and fair- dynamic standing balance thus requiring Quinn/CGA for gait training. During gait training pt ambulated a total of 6 feet with RW/GB use with Quinn with a slow/shuffling gait pattern; pt became increasingly lightheaded thus requiring a return to supine position. Left pt in bed with all needs met and call bell within reach. Recommend HHPT vs. SNF at time of discharge depending on pt progress with PT.    Progression toward goals:  []      Improving appropriately and progressing toward goals  [x]      Improving slowly and progressing toward goals  []      Not making progress toward goals and plan of care will be adjusted     PLAN:  Patient continues to benefit from skilled intervention to address the above impairments. Continue treatment per established plan of care. Discharge Recommendations:  [address]  Further Equipment Recommendations for Discharge:  rolling walker     SUBJECTIVE:   Patient stated I am feeling a little dizzy today.     OBJECTIVE DATA SUMMARY:   Critical Behavior:  Neurologic State: Alert, Appropriate for age  Orientation Level: Appropriate for age, Oriented X4  Cognition: Appropriate decision making, Appropriate for age attention/concentration, Appropriate safety awareness, Follows commands  Safety/Judgement: Awareness of environment, Fall prevention, Good awareness of safety precautions, Insight into deficits  Functional Mobility Training:  Bed Mobility:  Rolling: Contact guard assistance; Additional time  Supine to Sit: Contact guard assistance; Additional time  Sit to Supine: Contact guard assistance; Additional time  Scooting: Contact guard assistance; Additional time   Transfers:  Sit to Stand: Minimum assistance; Additional time  Stand to Sit: Contact guard assistance; Additional time   Balance:  Sitting: Intact  Standing: Impaired; With support  Standing - Static: Fair  Standing - Dynamic : Fair  Ambulation/Gait Training:  Distance (ft): 6 Feet (ft)  Assistive Device: Walker, rolling;Gait belt  Ambulation - Level of Assistance: Minimal assistance; Additional time   Gait Abnormalities: Antalgic;Decreased step clearance   Base of Support: Widened  Stance: Left decreased;Right increased  Speed/Nia: Slow;Shuffled  Step Length: Right shortened;Left shortened  Swing Pattern: Right asymmetrical;Left asymmetrical   Therapeutic Exercises:   HEP included ankle pumps, heel slides, LAQ x 10 reps each  Pain:  Pain Scale 1: Numeric (0 - 10)  Pain Intensity 1: 0   Activity Tolerance:   Fair  Please refer to the flowsheet for vital signs taken during this treatment.   After treatment:   [] Patient left in no apparent distress sitting up in chair  [x] Patient left in no apparent distress in bed  [x] Call bell left within reach  [x] Nursing notified  [] Caregiver present  [] Bed alarm activated       Ramos Guillen PT, DPT      Time Calculation: 21 mins

## 2018-06-11 NOTE — ROUTINE PROCESS
Bedside and Verbal shift change report given to Ilya Plummer RN  (oncoming nurse) by Royal Ahumada, RN  (offgoing nurse). Report given with SBAR, Kardex, Intake/Output and Recent Results.

## 2018-06-11 NOTE — DISCHARGE SUMMARY
Discharge Summary    Patient: Trace Jenkins MRN: [id number]  CSN: [id number]    YOB: 1935  Age: 82 y.o.   Sex: female    DOA: 6/7/2018 LOS:  LOS: 4 days   Discharge Date:      Primary Care Provider:  Dimitrios Balderrama MD    Admission Diagnoses: Hypomagnesemia  Ambulatory dysfunction    Discharge Diagnoses:    Problem List as of 6/11/2018  Date Reviewed: 6/7/2018          Codes Class Noted - Resolved    Hypomagnesemia ICD-10-CM: E83.42  ICD-9-CM: 275.2  6/7/2018 - Present        Ambulatory dysfunction ICD-10-CM: R26.2  ICD-9-CM: 719.7  6/7/2018 - Present        * (Principal)JONES (acute kidney injury) ([facility] ICD-10-CM: N17.9  ICD-9-CM: 584.9  6/7/2018 - Present        Cellulitis ICD-10-CM: L03.90  ICD-9-CM: 682.9  6/7/2018 - Present        Ischemic cardiomyopathy ICD-10-CM: I25.5  ICD-9-CM: 414.8  6/14/2013 - Present    Overview Signed 6/14/2013  6:48 AM by Miesha Guzmán     EF 40%             Chronic systolic CHF (congestive heart failure) (HCC) ICD-10-CM: I50.22  ICD-9-CM: 428.22, 428.0  6/14/2013 - Present        High cholesterol ICD-10-CM: E78.00  ICD-9-CM: 272.0  6/14/2013 - Present        Chronic airway obstruction, not elsewhere classified ICD-10-CM: J44.9  ICD-9-CM: 628  6/14/2013 - Present        H/O orthostatic hypotension ICD-10-CM: Z86.79  ICD-9-CM: V12.59  11/13/2012 - Present        DM2 (diabetes mellitus, type 2) (HCC) (Chronic) ICD-10-CM: E11.9  ICD-9-CM: 250.00  7/8/2012 - Present        Bradycardia ICD-10-CM: R00.1  ICD-9-CM: 427.89  7/8/2012 - Present        Seizure disorder ([facility] (Chronic) ICD-10-CM: G40.909  ICD-9-CM: 345.90  7/8/2012 - Present        CKD (chronic kidney disease) (Chronic) ICD-10-CM: N18.9  ICD-9-CM: 585.9  7/8/2012 - Present    Overview Signed 7/13/2012 10:58 AM by Jie Gillette MD     Stage 3 CKD             Multi-vessel coronary artery disease ICD-10-CM: I25.10  ICD-9-CM: 414.00  Unknown - Present        Peripheral vascular disease and claudication ICD-10-CM: I73.9  ICD-9-CM: 443.9  Unknown - Present        Carotid artery disease ([facility] [address] ICD-10-CM: I77.9  ICD-9-CM: 447.9  Unknown - Present        Hypertension ICD-10-CM: I10  ICD-9-CM: 401.9  Unknown - Present        RESOLVED: Fall ICD-10-CM: W19. Juliano Aquino  ICD-9-CM: E888.9  7/16/2012 - 6/14/2013        RESOLVED: Syncope and collapse ICD-10-CM: R55  ICD-9-CM: 780.2  7/8/2012 - 6/14/2013              Discharge Medications:     Current Discharge Medication List      START taking these medications    Details   amoxicillin-clavulanate (AUGMENTIN) 500-125 mg per tablet Take 1 Tab by mouth two (2) times a day for 10 days. Qty: 20 Tab, Refills: 0         CONTINUE these medications which have CHANGED    Details   carvedilol (COREG) 6.25 mg tablet Take 1 Tab by mouth two (2) times daily (with meals). Qty: 60 Tab, Refills: 0         CONTINUE these medications which have NOT CHANGED    Details   CARBAMAZEPINE (TEGRETOL PO) Take 300 mg by mouth two (2) times a day. Cholecalciferol, Vitamin D3, 50,000 unit cap Take 50,000 Units by mouth every seven (7) days. insulin lispro (HUMALOG KWIKPEN) 100 unit/mL kwikpen 8 Units by SubCUTAneous route Before breakfast and dinner. fludrocortisone (FLORINEF) 0.1 mg tablet TAKE 1 TABLET DAILY  Qty: 90 Tab, Refills: 1      solifenacin (VESICARE) 5 mg tablet Take 5 mg by mouth daily. cilostazol (PLETAL) 50 mg tablet Take 50 mg by mouth Before breakfast and dinner. insulin glargine (LANTUS) 100 unit/mL injection 20 Units by SubCUTAneous route two (2) times a day. sodium bicarbonate 650 mg tablet Take 2 Tabs by mouth two (2) times a day. Qty: 360 Tab, Refills: 3      aspirin 81 mg tablet Take 81 mg by mouth every other day. Sevelamer Carbonate (RENVELA) 800 mg Tab Take 800 mg by mouth daily (with lunch). tiotropium-olodaterol (STIOLTO RESPIMAT) 2.5-2.5 mcg/actuation mist Take  by inhalation.       albuterol (PROAIR HFA) 90 mcg/actuation inhaler Take  by inhalation. CYCLOSPORINE (RESTASIS OP) Apply 1 Drop to eye two (2) times a day. STOP taking these medications       pitavastatin (LIVALO) 1 mg tab tablet Comments:   Reason for Stopping:               Discharge Condition: Stable    Procedures : None    Consults: None      PHYSICAL EXAM   Visit Vitals    /68 (BP 1 Location: Left arm, BP Patient Position: At rest)    Pulse 77    Temp 98.5 °F (36.9 °C)    Resp 14    Ht 5' 5\" (1.651 m)    Wt 65.4 kg (144 lb 3.2 oz)    SpO2 100%    BMI 24 kg/m2     General: Awake, cooperative, no acute distress    HEENT: NC, Atraumatic. PERRLA, EOMI. Anicteric sclerae. Lungs:  CTA Bilaterally. No Wheezing/Rhonchi/Rales. Heart:  Regular  rhythm,  No murmur, No Rubs, No Gallops  Abdomen: Soft, Non distended, Non tender. +Bowel sounds,   Extremities: No c/c/e  Psych:   Not anxious or agitated. Neurologic:  No acute neurological deficits. Admission HPI : Murray Mckeon is a 82 y.o. female with past medical history significant for CHF, CKD, HTN, CAD, PAD DM2 w recent excision of skin cancer on her leg presents to the ER with acute onset of generalized weakness. She had reported to EMS she had chest heaviness but denies to me currently. She reports she felt she \" needed to put \" her head down on the coutner and had a difficutly getting it up. She denies LOC, dizzienss, n,v,diarrhea. She does report that her LE has been getting more erythematous but denies fever or chills.     On presentation to the ER she was afeberile, normotenstive with out hypoxia. She had nonfocal neurologic examination, clear lungs, dry mucus membranes. Her LLE had an incision which was sutured w erythema extending from incision site. Labs reveal leukocytosis, elevated Cr and Mg 0.9. EKG nonfocal, CXR clear. Medicine is asked to admit for further management. Hospital Course :  This patient was admitted to medical services on June 7, 2018 for cellulitis involving her left lower extremity and an acute kidney injury. For her cellulitis, she was started on IV Clindamycin and monitored. Prior to discharge a duplex was ordered which was negative for any DVT. The patient began to develop nausea associated with clindamycin, so this was discontinued prior to discharge. The overall appearance of the cellulitis involving the previous surgical incision improved, but was still present at the time of discharge. Because of this, she will be discharged to the SNF on Augmentin 500/125mg BID for 10 days. She is scheduled to have the sutures removed on Thursday, June 14th. Her JONES was monitored daily while she was initiated on gentle IVF for rehydration. Over the course of her hospitalization her Cr improved and returned to baseline. Upon admission she was found to have severe hypomagnesemia, and this was replaced via IV magnesium. With her history of chronic systolic heart failure, her fluid status was monitored closely and she exhibited no signs of acute exacerbation during her stay. Her coronary artery disease medications were continued upon admission including Pletal and aspirin. Her diabetes was managed with sliding scale insulin and a cardiac diet. The patient was scheduled to receive an iron infusion on Wednesday. This was discussed with the ordering physician, Dr Best Angelo, the patient's nephrologist. He stated that she could receive the infusion while hospitalized without any issues from his perspective. She received 300mg of IV Venofer while hospitalized. The patient will be discharged today to SNF in stable condition with an extended course of oral antibiotics. She was instructed to resume her usual diabetic medications as well as her medications for CAD.      Activity: PT/OT Eval and Treat    Diet: Cardiac Diet and Diabetic Diet    Follow-up: PCP    Disposition: SNF    Minutes spent on discharge: 45       Labs: Results:       Chemistry Recent Labs      06/11/18   0305  06/10/18   0625  06/09/18   0506   GLU  143*  129*  127*   NA  142  143  145   K  3.3*  3.5  3.1*   CL  107  109*  107   CO2  21  20*  21   BUN  44*  45*  56*   CREA  1.46*  1.39*  1.50*   CA  6.6*  6.5*  6.4*   AGAP  14  14  17   BUCR  30*  32*  37*   AP   --   65   --    TP   --   6.0*   --    ALB   --   2.3*   --    GLOB   --   3.7   --    AGRAT   --   0.6*   --       CBC w/Diff Recent Labs      06/11/18   0305  06/10/18   0625  06/09/18   0506   WBC  11.4  11.5  11.7   RBC  3.58*  3.55*  3.73*   HGB  10.0*  10.0*  10.4*   HCT  31.1*  30.9*  32.3*   PLT  45*  30*  37*   GRANS   --   84*  81*   LYMPH   --   3*  9*   EOS   --   0  0      Cardiac Enzymes No results for input(s): CPK, CKND1, THONG in the last 72 hours. No lab exists for component: CKRMB, TROIP   Coagulation No results for input(s): PTP, INR, APTT in the last 72 hours. No lab exists for component: INREXT    Lipid Panel No results found for: CHOL, CHOLPOCT, CHOLX, CHLST, CHOLV, 941712, HDL, LDL, LDLC, DLDLP, 349188, VLDLC, VLDL, TGLX, TRIGL, TRIGP, TGLPOCT, CHHD, CHHDX   BNP No results for input(s): BNPP in the last 72 hours. Liver Enzymes Recent Labs      06/10/18   0625   TP  6.0*   ALB  2.3*   AP  65   SGOT  23      Thyroid Studies Lab Results   Component Value Date/Time    TSH 0.60 06/08/2018 04:33 AM            Significant Diagnostic Studies: Xr Chest Pa Lat    Result Date: 6/8/2018  CLINICAL HISTORY:  Weakness. COMPARISON EXAMINATIONS:  March 29, 2016. ---  CHEST PA AND LATERAL  --- The cardiomediastinal silhouette is normal. There is no focal consolidation or pleural effusion. There has been a previous median sternotomy. The bony structures and soft tissues are unremarkable. --------------    IMPRESSION: -------------- No active cardiopulmonary disease. Xr Foot Lt Min 3 V    Result Date: 6/9/2018  Examination: Left foot 3 views. HISTORY: Left foot pain.  FINDINGS: Nonweightbearing dorsal, oblique, and lateral views of the left foot are performed and interpreted without comparison. Osseous alignment is as expected on these nonweightbearing views. No fracture. No retained radiopaque foreign object. Mild left first MTP joint osteoarthritis. Small plantar calcaneal spur. Mild diffuse left foot soft tissue swelling. IMPRESSION: 1. Mild and diffuse left foot soft tissue swelling without evidence of fracture or acute malalignment. 2. Mild left first MTP joint osteoarthritis. Ct Head Wo Cont    Result Date: 6/8/2018  EXAM: CT head INDICATION: Gait disturbance. COMPARISON: July 15, 2012. TECHNIQUE: Axial CT imaging of the head was performed without intravenous contrast. Dose reduction techniques used: automated exposure control, adjustment of the mAs and/or kVp according to patient size, and iterative reconstruction techniques. _______________ FINDINGS: BRAIN AND POSTERIOR FOSSA: There is cerebral volume loss with prominence of the lateral and third ventricles. There is mild cerebellar volume loss with prominence of the fourth ventricle. The cortical sulci and cerebellar folia are normally outlined. There is moderate bilateral periventricular and central white matter diminished attenuation. There is left parietal encephalomalacia. There is no hemorrhage mass or midline shift. EXTRA-AXIAL SPACES AND MENINGES: There are no abnormal extra-axial fluid collections. CALVARIUM: Intact. SINUSES: Clear. OTHER: None. _______________     IMPRESSION: Cerebral volume loss with moderate periventricular and central white matter diminished attenuation consistent with chronic microvascular disease. Mild cerebellar volume loss. 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 No acute territorial defect or hemorrhage seen. 24        No results found for this or any previous visit.         CC: Scar Domingo MD

## 2018-06-11 NOTE — PROGRESS NOTES
Chart reviewed pt has been accepted at the [address] [place] [address],[address] which was patients first choice,please update cm of patients possible disposition date so facility can be updated.

## 2018-06-11 NOTE — PROGRESS NOTES
Problem: Mobility Impaired (Adult and Pediatric)  Goal: *Acute Goals and Plan of Care (Insert Text)  Physical Therapy Goals   Initiated 6/9/2018 and to be accomplished within 5-7 day(s)  1. Patient will move from supine <> sit with S in prep for out of bed activity and change of position. 2.  Patient will perform sit<> stand with S with LRAD in prep for transfers/ambulation. 3.  Patient will transfer from bed <> chair with S with LRAD for time up in chair for completion of ADL activity. 4.  Patient will ambulate 150 feet with LRAD/S for improved functional mobility/safe discharge. 5.  Patient will ascend/descend >1 step with contact guard assist for home re-entry as needed. Pt refused PT due to:    []  Nausea/vomiting  [x]  Eating  []  Pain  []  Pt lethargic  []  Off Unit    Will f/u later as schedule allows. Thank you.     Ramos Guillen PT, DPT

## 2018-06-11 NOTE — PROGRESS NOTES
[id number] Shift Summary: Pt rested well overnight with no complaints. No new clinical concerns noted.

## 2018-06-11 NOTE — PROGRESS NOTES
Cm spoke with LINCOLN Galdamez plan is to D/c pt after iron infusion,spoke with pt at bedside when discharged pt would like to go to The [address] [place] [address],[address] [id number] [phone] please send d/c summary,medication hard scripts,,RN please call report prior to d/c ,cm informed PA pt need to arrive at facility by 6pm with d/c summary ,pt request for medical transport due to difficulty ambulating aware of possible fees and accepts. Case Management Assessment      Preferred Language for Healthcare Related Communication     Preferred Language for Healthcare Related Communication: English   Spiritual/Ethnic/Cultural/Episcopal Needs that Should be [address] in Past 12 Months: Yes   Fall With Injury: No   Number of Falls Within 12 Months: 1   Approximate Date of Fall: 6/7/2018 (pt stats she lowered herself to the floor)   Decline in Gait/Transfer/Balance: Yes (comment) (pt lethargic, difficulty walking)       Decline in Capacity to Feed/Dress/Bathe: No   Developmental Delay: No   Chewing/Swallowing Problems: No      DYSPHAGIA SCREENING                          Difficulty with Secretions     Difficulty with Secretions: No      Speech Slurred/Thick/Garbled     Speech Slurred/Thick/Garbled: No      ABUSE/NEGLECT SCREENING   Physical Abuse/Neglect: Denies   Sexual Abuse: Denies   Sexual Abuse: Denies   Other Abuse/Issues: Denies          PRIMARY DECISION MAKER                                        ADVANCE CARE PLANNING (ACP) DOCUMENTS                  Suicide/Psychosocial Screening                                      SAD PERSONS                                                  READMIT RISK TOOL   Support Systems: Friends \ neighbors                       Living Alone: Yes      CARE MANAGEMENT INTERVENTIONS       PCP Verified by CM:  Yes       Palliative Care Criteria Met (RRAT>21 & CHF Dx)?: No   Mode of Transport at Discharge: S Discharge Durable Medical Equipment: No   Physical Therapy Consult: Yes   Occupational Therapy Consult: Yes   Speech Therapy Consult: No   Current Support Network: Nursing Facility                                       Primary Language: English                                   Confirm Follow Up Transport: Self       Plan discussed with Pt/Family/Caregiver: Yes   Freedom of Choice Offered: Yes      DISCHARGE LOCATION

## 2018-06-11 NOTE — ACP (ADVANCE CARE PLANNING)
attempted to visit patient and address Advance Medical Directive. Patient was asleep each time, Advance Medical Directive booklet left on table for patient. [facility] will attempt to follow up with patient when she is awake and alert. Chaplains remain available to provide pastoral support as needed and requested. Rev.  [address] [facility]  [phone]

## 2018-06-11 NOTE — PROGRESS NOTES
1000 Assumed care of patent from Jose Luis Sauceda RN    5749 Assessment completed, patient is alert and oriented x's 4, no c/o pain. NSR on the monitor with a HR in the 70's. Lung fields are clear throughout on RA, 02 sat sustained at 100% with no cough noted. Patient is tolerating a cardiac diet without any N/V or gastric distention. Right abdominal colostomy draining loose stool without any complications. Old left shin incision ROBBIN without any drainage. Scheduled medications administered as ordered without any complications. Patient is currently sitting up in bed consuming lunch, no s/s of any distress, VSS, call bell and personal belongings within reach, will continue to monitor    1200 NAD, will continue to monitor. 1524 patient experiencing nausea, PRN zofran administered. Patient is currently sitting up in bed, will continue to monitor    1635 Scheduled medications administered as ordered without any complications. Patient is currently sitting up in bed watching television awaiting the arrival of transportation to bed discharged to the Walker, no s/s of any distress, will continue to monitor    1656 TRANSFER - OUT REPORT:    Verbal report given to Eli Marina RN(name) on Jeancarlos Billy  [facility] for routine progression of care       Report consisted of patients Situation, Background, Assessment and   Recommendations(SBAR). Information from the following report(s) SBAR was reviewed with the receiving nurse. Lines:   Peripheral IV 06/11/18 Left Wrist (Active)   Site Assessment Clean, dry, & intact 6/11/2018  2:00 PM   Phlebitis Assessment 0 6/11/2018  2:00 PM   Infiltration Assessment 0 6/11/2018  2:00 PM   Dressing Status Clean, dry, & intact 6/11/2018  2:00 PM   Dressing Type Transparent 6/11/2018  2:00 PM   Hub Color/Line Status Yellow 6/11/2018  2:00 PM   Alcohol Cap Used Yes 6/11/2018  2:00 PM        Opportunity for questions and clarification was provided. Patient transported with:   medical transport

## 2018-06-13 ENCOUNTER — HOSPITAL ENCOUNTER (OUTPATIENT)
Dept: INFUSION THERAPY | Age: 83
Discharge: HOME | End: 2018-06-13

## 2018-06-13 RX ORDER — SODIUM CHLORIDE 0.9 % (FLUSH) 0.9 %
10 SYRINGE (ML) INJECTION AS NEEDED
Status: CANCELLED | OUTPATIENT
Start: 2018-06-13

## 2018-06-13 NOTE — PROGRESS NOTES
Patient currently in rehabilitation at [facility]. Spoke to nurse, Karma Holt, who stated she would not be able to make today's appointment and would call back to confirm whether she would be able to come to tomorrow's appointment at .

## 2018-06-14 ENCOUNTER — HOSPITAL ENCOUNTER (OUTPATIENT)
Dept: INFUSION THERAPY | Age: 83
Discharge: HOME | End: 2018-06-14

## 2018-06-15 ENCOUNTER — APPOINTMENT (OUTPATIENT)
Dept: INFUSION THERAPY | Age: 83
End: 2018-06-15

## 2018-09-21 NOTE — PROGRESS NOTES
[facility] at [address] Asha Spears, [address] [place]  Ph [phone]  Fx [phone]    Physical Therapy Discharge Summary    Patient name: Enriqueta Pope Start of Care: 2017   Referral source: Bhavna Banks, * : 1935   Medical/Treatment Diagnosis: Other symptoms and signs involving the musculoskeletal system [R29.898] Onset Date:2017     Prior Hospitalization: see medical history Provider#: [id number]   Medications: Verified on Patient Summary List    Comorbidities: COPD, hypercholesterolemia, stage 3 CKF, DMII, HTN, PVD, CAD  Prior Level of Function:regular walking, no stairs, independent w/ ADLs    Visits from Start of Care: 14    Missed Visits: 3    Reporting Period : 2017 to 3/15/2018    Summary of Care:  Patient did not return to clinic. Goals unable to be reassessed.       ASSESSMENT/RECOMMENDATIONS:  [x]Discontinue therapy: []Patient has reached or is progressing toward set goals      [x]Patient is non-compliant or has abdicated      []Due to lack of appreciable progress towards set goals      Savage Jackson, PT, DPT 2018 10:22 AM

## 2018-10-23 ENCOUNTER — HOSPITAL ENCOUNTER (OUTPATIENT)
Dept: INFUSION THERAPY | Age: 83
End: 2018-10-23

## 2018-10-25 ENCOUNTER — HOSPITAL ENCOUNTER (OUTPATIENT)
Dept: INFUSION THERAPY | Age: 83
End: 2018-10-25